# Patient Record
Sex: FEMALE | Race: BLACK OR AFRICAN AMERICAN | NOT HISPANIC OR LATINO | URBAN - METROPOLITAN AREA
[De-identification: names, ages, dates, MRNs, and addresses within clinical notes are randomized per-mention and may not be internally consistent; named-entity substitution may affect disease eponyms.]

---

## 2017-10-28 ENCOUNTER — EMERGENCY (EMERGENCY)
Facility: HOSPITAL | Age: 27
LOS: 1 days | Discharge: HOME | End: 2017-10-28
Attending: EMERGENCY MEDICINE | Admitting: EMERGENCY MEDICINE
Payer: MEDICAID

## 2017-10-28 VITALS
OXYGEN SATURATION: 100 % | HEART RATE: 81 BPM | DIASTOLIC BLOOD PRESSURE: 69 MMHG | TEMPERATURE: 98 F | RESPIRATION RATE: 16 BRPM | SYSTOLIC BLOOD PRESSURE: 114 MMHG

## 2017-10-28 PROCEDURE — 99284 EMERGENCY DEPT VISIT MOD MDM: CPT | Mod: 25

## 2017-10-28 NOTE — ED ADULT TRIAGE NOTE - CHIEF COMPLAINT QUOTE
Patient c/o vaginal bleeding since October 7th, feels weak, has a head ache, and lower abdominal pain for a few days.

## 2017-10-29 LAB
ALBUMIN SERPL ELPH-MCNC: 4.6 G/DL — SIGNIFICANT CHANGE UP (ref 3.3–5)
ALP SERPL-CCNC: 58 U/L — SIGNIFICANT CHANGE UP (ref 40–120)
ALT FLD-CCNC: 28 U/L — SIGNIFICANT CHANGE UP (ref 4–33)
APPEARANCE UR: SIGNIFICANT CHANGE UP
AST SERPL-CCNC: 20 U/L — SIGNIFICANT CHANGE UP (ref 4–32)
BASOPHILS # BLD AUTO: 0.03 K/UL — SIGNIFICANT CHANGE UP (ref 0–0.2)
BASOPHILS NFR BLD AUTO: 0.3 % — SIGNIFICANT CHANGE UP (ref 0–2)
BILIRUB SERPL-MCNC: < 0.2 MG/DL — LOW (ref 0.2–1.2)
BILIRUB UR-MCNC: NEGATIVE — SIGNIFICANT CHANGE UP
BLOOD UR QL VISUAL: HIGH
BUN SERPL-MCNC: 8 MG/DL — SIGNIFICANT CHANGE UP (ref 7–23)
CALCIUM SERPL-MCNC: 9.2 MG/DL — SIGNIFICANT CHANGE UP (ref 8.4–10.5)
CHLORIDE SERPL-SCNC: 102 MMOL/L — SIGNIFICANT CHANGE UP (ref 98–107)
CO2 SERPL-SCNC: 25 MMOL/L — SIGNIFICANT CHANGE UP (ref 22–31)
COLOR SPEC: HIGH
CREAT SERPL-MCNC: 0.83 MG/DL — SIGNIFICANT CHANGE UP (ref 0.5–1.3)
EOSINOPHIL # BLD AUTO: 0.32 K/UL — SIGNIFICANT CHANGE UP (ref 0–0.5)
EOSINOPHIL NFR BLD AUTO: 3.5 % — SIGNIFICANT CHANGE UP (ref 0–6)
GLUCOSE SERPL-MCNC: 89 MG/DL — SIGNIFICANT CHANGE UP (ref 70–99)
GLUCOSE UR-MCNC: NEGATIVE — SIGNIFICANT CHANGE UP
HCG SERPL-ACNC: < 5 MIU/ML — SIGNIFICANT CHANGE UP
HCG UR-SCNC: NEGATIVE — SIGNIFICANT CHANGE UP
HCT VFR BLD CALC: 33.8 % — LOW (ref 34.5–45)
HGB BLD-MCNC: 10.7 G/DL — LOW (ref 11.5–15.5)
IMM GRANULOCYTES # BLD AUTO: 0.04 # — SIGNIFICANT CHANGE UP
IMM GRANULOCYTES NFR BLD AUTO: 0.4 % — SIGNIFICANT CHANGE UP (ref 0–1.5)
KETONES UR-MCNC: SIGNIFICANT CHANGE UP
LEUKOCYTE ESTERASE UR-ACNC: HIGH
LYMPHOCYTES # BLD AUTO: 3.2 K/UL — SIGNIFICANT CHANGE UP (ref 1–3.3)
LYMPHOCYTES # BLD AUTO: 34.6 % — SIGNIFICANT CHANGE UP (ref 13–44)
MCHC RBC-ENTMCNC: 29.9 PG — SIGNIFICANT CHANGE UP (ref 27–34)
MCHC RBC-ENTMCNC: 31.7 % — LOW (ref 32–36)
MCV RBC AUTO: 94.4 FL — SIGNIFICANT CHANGE UP (ref 80–100)
MONOCYTES # BLD AUTO: 0.53 K/UL — SIGNIFICANT CHANGE UP (ref 0–0.9)
MONOCYTES NFR BLD AUTO: 5.7 % — SIGNIFICANT CHANGE UP (ref 2–14)
MUCOUS THREADS # UR AUTO: SIGNIFICANT CHANGE UP
NEUTROPHILS # BLD AUTO: 5.13 K/UL — SIGNIFICANT CHANGE UP (ref 1.8–7.4)
NEUTROPHILS NFR BLD AUTO: 55.5 % — SIGNIFICANT CHANGE UP (ref 43–77)
NITRITE UR-MCNC: NEGATIVE — SIGNIFICANT CHANGE UP
NRBC # FLD: 0 — SIGNIFICANT CHANGE UP
PH UR: 6 — SIGNIFICANT CHANGE UP (ref 4.6–8)
PLATELET # BLD AUTO: 343 K/UL — SIGNIFICANT CHANGE UP (ref 150–400)
PMV BLD: 10 FL — SIGNIFICANT CHANGE UP (ref 7–13)
POTASSIUM SERPL-MCNC: 3.5 MMOL/L — SIGNIFICANT CHANGE UP (ref 3.5–5.3)
POTASSIUM SERPL-SCNC: 3.5 MMOL/L — SIGNIFICANT CHANGE UP (ref 3.5–5.3)
PROT SERPL-MCNC: 8.1 G/DL — SIGNIFICANT CHANGE UP (ref 6–8.3)
PROT UR-MCNC: 300 — HIGH
RBC # BLD: 3.58 M/UL — LOW (ref 3.8–5.2)
RBC # FLD: 12.8 % — SIGNIFICANT CHANGE UP (ref 10.3–14.5)
RBC CASTS # UR COMP ASSIST: >50 — HIGH (ref 0–?)
SODIUM SERPL-SCNC: 140 MMOL/L — SIGNIFICANT CHANGE UP (ref 135–145)
SP GR SPEC: 1.03 — SIGNIFICANT CHANGE UP (ref 1–1.03)
SP GR UR: 1.03 — SIGNIFICANT CHANGE UP (ref 1–1.03)
SQUAMOUS # UR AUTO: SIGNIFICANT CHANGE UP
UROBILINOGEN FLD QL: NORMAL E.U. — SIGNIFICANT CHANGE UP (ref 0.1–0.2)
WBC # BLD: 9.25 K/UL — SIGNIFICANT CHANGE UP (ref 3.8–10.5)
WBC # FLD AUTO: 9.25 K/UL — SIGNIFICANT CHANGE UP (ref 3.8–10.5)
WBC CLUMPS #/AREA URNS HPF: PRESENT — HIGH (ref 0–?)
WBC UR QL: SIGNIFICANT CHANGE UP (ref 0–?)

## 2017-10-29 NOTE — ED PROVIDER NOTE - OBJECTIVE STATEMENT
28 yo woman here w/ vaginal bleeding. States she developed bleeding approx 3 wks ago, daily, passing clots. Saw OB/GYN and was prescribed course of norethindrone. Prior to this, had sono showing PCOS. Also w/ mild lower abd pain. States she came tonight requesting sonogram. Denies fever, lightheadedness, n/v/d.

## 2017-10-29 NOTE — ED ADULT NURSE NOTE - OBJECTIVE STATEMENT
Patient is bought to room 7 with complaints of vaginal bleeding that has been ongoing since October 7th, accompanied with bilateral flank pain. denies fever, chills. Patient is alert and oriented x 4, abdomen soft and nontender, respirations are even and unlabored, urine appears bloody. 20 gauge saline lock placed on left AC, blood drawn and sent. Will follow up and monitor.

## 2017-10-29 NOTE — ED PROVIDER NOTE - PLAN OF CARE
Call your OB/GYN today or tomorrow to schedule follow up appointment for within the next 3-5 days.  Continue taking your medications as prescribed.  Return to this Emergency Department if you experience worsening condition or for any other emergency.

## 2017-10-29 NOTE — ED PROVIDER NOTE - ATTENDING CONTRIBUTION TO CARE
[place] Attending Note - Dr. Hendrix  28 yo woman here w/ vaginal bleeding. States she developed bleeding approx 3 wks ago, daily, passing clots  PE: pt is alert and oriented, perrl, ent normal, membranes are moist, neck supple. no lymphadenopathy or thyroid enlargement, No JVD.  Chest clear to P&A, Heart- reg rhythm without murmur, rubs or gallops, radial pulses equal bilaterally.  Abd is soft, non-tender, Bowel sounds are active. no mass or organomegaly. : No CVA tenderness. Neuro:  Pt alert and oriented x 3. Perrl    Distal neurosensory is intact. Motor function is 5/5 strength bilaterally.  No focal deficits. Extremities:  No edema.  Skin: warm and dry.  Impression: Vaginal bleeding   Plan: labs, Hcg

## 2017-10-29 NOTE — ED PROVIDER NOTE - CARE PLAN
Principal Discharge DX:	Dysfunctional uterine bleeding  Instructions for follow-up, activity and diet:	Call your OB/GYN today or tomorrow to schedule follow up appointment for within the next 3-5 days.  Continue taking your medications as prescribed.  Return to this Emergency Department if you experience worsening condition or for any other emergency.

## 2017-10-30 LAB — SPECIMEN SOURCE: SIGNIFICANT CHANGE UP

## 2017-10-31 RX ORDER — CEPHALEXIN 500 MG
1 CAPSULE ORAL
Qty: 14 | Refills: 0 | OUTPATIENT
Start: 2017-10-31 | End: 2017-11-07

## 2017-10-31 NOTE — ED POST DISCHARGE NOTE - RESULT SUMMARY
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 Patient contacted admits to flank pain but denies fever or chills. Discussed with patient can ERX Keflex 500mg BID x 7 days. Patient agreed and will follow up with MD for repeat UA/UCX. Tried to find [facility] on prescription writer couldn't find [facility] at  gave a verbal rx for Keflex 500mg BID X 7 days. Discussed with patient need to return to ED if symptoms don't continue to improve or recur or develops any new or worsening symptoms that are of concern.

## 2017-11-01 LAB — BACTERIA UR CULT: SIGNIFICANT CHANGE UP

## 2018-01-16 ENCOUNTER — RESULT REVIEW (OUTPATIENT)
Age: 28
End: 2018-01-16

## 2019-02-06 ENCOUNTER — RESULT REVIEW (OUTPATIENT)
Age: 29
End: 2019-02-06

## 2019-07-23 NOTE — ED ADULT NURSE NOTE - TIMING
[Well Groomed] : well groomed [General Appearance - Well Developed] : well developed [Normal Appearance] : normal appearance [General Appearance - Well Nourished] : well nourished [No Deformities] : no deformities [Normal Oral Mucosa] : normal oral mucosa [Normal Conjunctiva] : the conjunctiva exhibited no abnormalities [General Appearance - In No Acute Distress] : no acute distress [Normal Oropharynx] : normal oropharynx [Normal Jugular Venous V Waves Present] : normal jugular venous V waves present [Respiration, Rhythm And Depth] : normal respiratory rhythm and effort [Exaggerated Use Of Accessory Muscles For Inspiration] : no accessory muscle use [Heart Rate And Rhythm] : heart rate and rhythm were normal [Auscultation Breath Sounds / Voice Sounds] : lungs were clear to auscultation bilaterally [Heart Sounds] : normal S1 and S2 [Murmurs] : no murmurs present [Arterial Pulses Normal] : the arterial pulses were normal [Edema] : no peripheral edema present [Abdomen Soft] : soft [Abnormal Walk] : normal gait [Abdomen Tenderness] : non-tender [Cyanosis, Localized] : no localized cyanosis [Nail Clubbing] : no clubbing of the fingernails [Skin Color & Pigmentation] : normal skin color and pigmentation [Skin Turgor] : normal skin turgor [] : no rash [Oriented To Time, Place, And Person] : oriented to person, place, and time [Mood] : the mood was normal [Affect] : the affect was normal [Impaired Insight] : insight and judgment were intact [No Anxiety] : not feeling anxious none

## 2019-09-03 ENCOUNTER — TRANSCRIPTION ENCOUNTER (OUTPATIENT)
Age: 29
End: 2019-09-03

## 2019-09-07 ENCOUNTER — TRANSCRIPTION ENCOUNTER (OUTPATIENT)
Age: 29
End: 2019-09-07

## 2019-09-18 ENCOUNTER — APPOINTMENT (OUTPATIENT)
Dept: OTOLARYNGOLOGY | Facility: CLINIC | Age: 29
End: 2019-09-18

## 2019-10-05 NOTE — ED PROVIDER NOTE - MEDICAL DECISION MAKING DETAILS
Tabby 26 yo woman w/ vaginal bleeding. Will check labs, preg. If not pregnant and not anemic, can f/u as outpt for further evaluation. In setting of non tender exam, no indication for sonogram today.

## 2019-12-22 ENCOUNTER — TRANSCRIPTION ENCOUNTER (OUTPATIENT)
Age: 29
End: 2019-12-22

## 2020-01-04 ENCOUNTER — TRANSCRIPTION ENCOUNTER (OUTPATIENT)
Age: 30
End: 2020-01-04

## 2020-02-06 ENCOUNTER — TRANSCRIPTION ENCOUNTER (OUTPATIENT)
Age: 30
End: 2020-02-06

## 2020-08-13 PROBLEM — Z00.00 ENCOUNTER FOR PREVENTIVE HEALTH EXAMINATION: Status: ACTIVE | Noted: 2020-08-13

## 2020-11-19 ENCOUNTER — RESULT REVIEW (OUTPATIENT)
Age: 30
End: 2020-11-19

## 2021-01-15 ENCOUNTER — EMERGENCY (EMERGENCY)
Facility: HOSPITAL | Age: 31
LOS: 1 days | Discharge: HOME | End: 2021-01-15
Attending: EMERGENCY MEDICINE | Admitting: EMERGENCY MEDICINE
Payer: MEDICAID

## 2021-01-15 VITALS
DIASTOLIC BLOOD PRESSURE: 89 MMHG | SYSTOLIC BLOOD PRESSURE: 143 MMHG | RESPIRATION RATE: 18 BRPM | HEART RATE: 108 BPM | OXYGEN SATURATION: 100 %

## 2021-01-15 VITALS — TEMPERATURE: 98 F

## 2021-01-15 PROCEDURE — 99283 EMERGENCY DEPT VISIT LOW MDM: CPT

## 2021-01-15 RX ORDER — ACETAMINOPHEN 500 MG
650 TABLET ORAL ONCE
Refills: 0 | Status: COMPLETED | OUTPATIENT
Start: 2021-01-15 | End: 2021-01-15

## 2021-01-15 RX ORDER — IBUPROFEN 200 MG
600 TABLET ORAL ONCE
Refills: 0 | Status: COMPLETED | OUTPATIENT
Start: 2021-01-15 | End: 2021-01-15

## 2021-01-15 RX ORDER — OXYCODONE AND ACETAMINOPHEN 5; 325 MG/1; MG/1
1 TABLET ORAL ONCE
Refills: 0 | Status: DISCONTINUED | OUTPATIENT
Start: 2021-01-15 | End: 2021-01-15

## 2021-01-15 RX ADMIN — Medication 650 MILLIGRAM(S): at 16:30

## 2021-01-15 RX ADMIN — OXYCODONE AND ACETAMINOPHEN 1 TABLET(S): 5; 325 TABLET ORAL at 14:19

## 2021-01-15 RX ADMIN — Medication 600 MILLIGRAM(S): at 14:18

## 2021-01-15 NOTE — ED PROVIDER NOTE - CLINICAL SUMMARY MEDICAL DECISION MAKING FREE TEXT BOX
30 year old female with no known PMH presents to the ED complaining of left sided dental pain for 3 days. HD stable. No clinical evidence of deep soft tissue neck infections. Plan for analgesics, dental consult, reassess.

## 2021-01-15 NOTE — PROGRESS NOTE ADULT - SUBJECTIVE AND OBJECTIVE BOX
Patient is a 30y old  Female who presents with a chief complaint of tooth pain.    HPI: 30 year old female with no known PMH presents to the ED complaining of left sided dental pain for 3 days. Pt states she went to her dentist and a dental block which relieved the pain but for the past few hours, pain has worsened. Denies jaw swelling, neck pain, trismus, stridor, fevers and chills. Denies any over complaints.    PAST MEDICAL & SURGICAL HISTORY:  No pertinent past medical history      MEDICATIONS  (STANDING): * No Current Medications as of 31-Oct-2017 12:03 documented in Structured Notes    MEDICATIONS  (PRN): CURRENT ORDERS/:   · 	ibuprofen  Tablet., [Known as MOTRIN.]  	600 milliGRAM(s), Oral, Once, Stop After 1 Doses  	Provider's Contact #: [phone], 15-Alejo-2021, Active, 15-Alejo-2021, Standard  · 	oxycodone    5 mG/acetaminophen 325 mG, [Ordered as PERCOCET]  	1 Tablet(s), Oral, Once, Stop After 1 Doses  	Administration Instructions: ACETAMINOPHEN MAX DAILY DOSE:  ADULT = 4,000 mG/Day      Allergies    No Known Allergies    Intolerances    FAMILY HISTORY:      *Last Dental Visit: 1/14/2021 for tooth pain. Patient reports DDS prescribed Amoxicillin 500 mg 21 tabs q8h for 7 days    Vital Signs Last 24 Hrs  T(C): --  T(F): --  HR: 108 (15 Alejo 2021 13:15) (108 - 108)  BP: 143/89 (15 Alejo 2021 13:15) (143/89 - 143/89)  BP(mean): --  RR: 18 (15 Alejo 2021 13:15) (18 - 18)  SpO2: 100% (15 Alejo 2021 13:15) (100% - 100%)    EOE:  TMJ (-) clicks                    (-) pops                    (-) crepitus             Mandible FROM             Facial bones and MOM grossly intact             (+) trismus limited opening             (-) LAD             (-) swelling             (-) asymmetry             (+) palpation pain in the lymph nodes surrounding left inferior border of mandible             (-) SOB             (+) dysphagia             (-) LOC    IOE:  permanent dentition: grossly intact           hard/soft palate:  (+) palatal torus           tongue/FOM : WNL, tongue piercing            labial/buccal mucosa WNL           (-) percussion           (-) palpation           (-) swelling     Dentition present: #2-13, #15, #18-31  Mobility: none  Caries: gross mesial caries tooth #18    LABS:    DENTAL RADIOGRAPHS: PAN taken and interpreted. Findings include:  - Adult dentition, grossly intact with multiple restorations  - Tooth #18 root-canal treated with gross caries on the mesial aspect; periapical radiolucency noted around apices  - Periapical radiolucency around tooth #29      RADIOLOGY & ADDITIONAL STUDIES:    ASSESSMENT: Necrotic tooth #31 with periapical radiolucency, no associated soft tissue swelling.    PROCEDURE: limited oral evaluation, PAN, 1.0 carpule 2% Lidocaine with 1:100,000 epinephrine via left inferior alveolar nerve block, 1.0 carpule Marcaine with 1:200,000 epinephrine via local buccal infiltration around tooth #18.  Verbal consent given.     RECOMMENDATIONS:  1) OTC pain meds alternating Ibuprofen acetaminophen, prescribed by ED team.  2) Finish abx course prescribed by outpatient DDS  2) Dental F/U with outpatient dentist for comprehensive dental care.   3) If any difficulty swallowing/breathing, fever occur, page dental.     Lucía Beavers DDS, #[id number]  Anayeli Rodarte DDS, #[id number]

## 2021-01-15 NOTE — ED ADULT TRIAGE NOTE - CHIEF COMPLAINT QUOTE
Arrives from home for left sided dental pain, went to dentist yesterday was given pain medications but pain is not relieved. Denies PMH

## 2021-01-15 NOTE — ED PROVIDER NOTE - PATIENT PORTAL LINK FT
You can access the FollowMyHealth Patient Portal offered by [facility] by registering at the following website: [email or web address]. By joining [employer]’s FollowMyHealth portal, you will also be able to view your health information using other applications (apps) compatible with our system.

## 2021-01-15 NOTE — ED PROVIDER NOTE - OBJECTIVE STATEMENT
30 year old female with no known PMH presents to the ED complaining of left sided dental pain for 3 days. Pt states she went to her dentist and a dental block which relieved the pain but for the past few hours, pain has worsened. Denies jaw swelling, neck pain, trismus, stridor, fevers and chills. Denies any over complaints.

## 2021-02-17 ENCOUNTER — RESULT REVIEW (OUTPATIENT)
Age: 31
End: 2021-02-17

## 2021-06-29 ENCOUNTER — TRANSCRIPTION ENCOUNTER (OUTPATIENT)
Age: 31
End: 2021-06-29

## 2022-09-19 ENCOUNTER — NON-APPOINTMENT (OUTPATIENT)
Age: 32
End: 2022-09-19

## 2022-11-06 ENCOUNTER — NON-APPOINTMENT (OUTPATIENT)
Age: 32
End: 2022-11-06

## 2022-11-08 ENCOUNTER — EMERGENCY (EMERGENCY)
Facility: HOSPITAL | Age: 32
LOS: 1 days | Discharge: HOME | End: 2022-11-08
Attending: EMERGENCY MEDICINE | Admitting: STUDENT IN AN ORGANIZED HEALTH CARE EDUCATION/TRAINING PROGRAM
Payer: MEDICAID

## 2022-11-08 VITALS
TEMPERATURE: 100 F | OXYGEN SATURATION: 100 % | HEART RATE: 88 BPM | RESPIRATION RATE: 16 BRPM | DIASTOLIC BLOOD PRESSURE: 118 MMHG | SYSTOLIC BLOOD PRESSURE: 135 MMHG

## 2022-11-08 DIAGNOSIS — H60.90 UNSPECIFIED OTITIS EXTERNA, UNSPECIFIED EAR: ICD-10-CM

## 2022-11-08 LAB
ALBUMIN SERPL ELPH-MCNC: 4.2 G/DL — SIGNIFICANT CHANGE UP (ref 3.3–5)
ALP SERPL-CCNC: 80 U/L — SIGNIFICANT CHANGE UP (ref 40–120)
ALT FLD-CCNC: 26 U/L — SIGNIFICANT CHANGE UP (ref 4–33)
ANION GAP SERPL CALC-SCNC: 11 MMOL/L — SIGNIFICANT CHANGE UP (ref 7–14)
AST SERPL-CCNC: 26 U/L — SIGNIFICANT CHANGE UP (ref 4–32)
BASOPHILS # BLD AUTO: 0.02 K/UL — SIGNIFICANT CHANGE UP (ref 0–0.2)
BASOPHILS NFR BLD AUTO: 0.2 % — SIGNIFICANT CHANGE UP (ref 0–2)
BILIRUB SERPL-MCNC: 0.2 MG/DL — SIGNIFICANT CHANGE UP (ref 0.2–1.2)
BUN SERPL-MCNC: 10 MG/DL — SIGNIFICANT CHANGE UP (ref 7–23)
CALCIUM SERPL-MCNC: 9.6 MG/DL — SIGNIFICANT CHANGE UP (ref 8.4–10.5)
CHLORIDE SERPL-SCNC: 104 MMOL/L — SIGNIFICANT CHANGE UP (ref 98–107)
CO2 SERPL-SCNC: 22 MMOL/L — SIGNIFICANT CHANGE UP (ref 22–31)
CREAT SERPL-MCNC: 0.73 MG/DL — SIGNIFICANT CHANGE UP (ref 0.5–1.3)
EGFR: 111 ML/MIN/1.73M2 — SIGNIFICANT CHANGE UP
EOSINOPHIL # BLD AUTO: 0.08 K/UL — SIGNIFICANT CHANGE UP (ref 0–0.5)
EOSINOPHIL NFR BLD AUTO: 0.8 % — SIGNIFICANT CHANGE UP (ref 0–6)
GLUCOSE SERPL-MCNC: 90 MG/DL — SIGNIFICANT CHANGE UP (ref 70–99)
HCT VFR BLD CALC: 40.1 % — SIGNIFICANT CHANGE UP (ref 34.5–45)
HGB BLD-MCNC: 13.4 G/DL — SIGNIFICANT CHANGE UP (ref 11.5–15.5)
IANC: 7.32 K/UL — SIGNIFICANT CHANGE UP (ref 1.8–7.4)
IMM GRANULOCYTES NFR BLD AUTO: 0.2 % — SIGNIFICANT CHANGE UP (ref 0–0.9)
LYMPHOCYTES # BLD AUTO: 1.41 K/UL — SIGNIFICANT CHANGE UP (ref 1–3.3)
LYMPHOCYTES # BLD AUTO: 15 % — SIGNIFICANT CHANGE UP (ref 13–44)
MCHC RBC-ENTMCNC: 29.3 PG — SIGNIFICANT CHANGE UP (ref 27–34)
MCHC RBC-ENTMCNC: 33.4 GM/DL — SIGNIFICANT CHANGE UP (ref 32–36)
MCV RBC AUTO: 87.7 FL — SIGNIFICANT CHANGE UP (ref 80–100)
MONOCYTES # BLD AUTO: 0.57 K/UL — SIGNIFICANT CHANGE UP (ref 0–0.9)
MONOCYTES NFR BLD AUTO: 6.1 % — SIGNIFICANT CHANGE UP (ref 2–14)
NEUTROPHILS # BLD AUTO: 7.32 K/UL — SIGNIFICANT CHANGE UP (ref 1.8–7.4)
NEUTROPHILS NFR BLD AUTO: 77.7 % — HIGH (ref 43–77)
NRBC # BLD: 0 /100 WBCS — SIGNIFICANT CHANGE UP (ref 0–0)
NRBC # FLD: 0 K/UL — SIGNIFICANT CHANGE UP (ref 0–0)
PLATELET # BLD AUTO: 280 K/UL — SIGNIFICANT CHANGE UP (ref 150–400)
POTASSIUM SERPL-MCNC: 4.1 MMOL/L — SIGNIFICANT CHANGE UP (ref 3.5–5.3)
POTASSIUM SERPL-SCNC: 4.1 MMOL/L — SIGNIFICANT CHANGE UP (ref 3.5–5.3)
PROT SERPL-MCNC: 7.9 G/DL — SIGNIFICANT CHANGE UP (ref 6–8.3)
RBC # BLD: 4.57 M/UL — SIGNIFICANT CHANGE UP (ref 3.8–5.2)
RBC # FLD: 11.9 % — SIGNIFICANT CHANGE UP (ref 10.3–14.5)
SODIUM SERPL-SCNC: 137 MMOL/L — SIGNIFICANT CHANGE UP (ref 135–145)
WBC # BLD: 9.42 K/UL — SIGNIFICANT CHANGE UP (ref 3.8–10.5)
WBC # FLD AUTO: 9.42 K/UL — SIGNIFICANT CHANGE UP (ref 3.8–10.5)

## 2022-11-08 PROCEDURE — 99220: CPT

## 2022-11-08 PROCEDURE — 70481 CT ORBIT/EAR/FOSSA W/DYE: CPT | Mod: 26,MA

## 2022-11-08 PROCEDURE — 99284 EMERGENCY DEPT VISIT MOD MDM: CPT

## 2022-11-08 RX ORDER — IBUPROFEN 200 MG
600 TABLET ORAL ONCE
Refills: 0 | Status: DISCONTINUED | OUTPATIENT
Start: 2022-11-08 | End: 2022-11-08

## 2022-11-08 RX ORDER — CIPROFLOXACIN HCL 0.3 %
4 DROPS OPHTHALMIC (EYE) ONCE
Refills: 0 | Status: COMPLETED | OUTPATIENT
Start: 2022-11-08 | End: 2022-11-08

## 2022-11-08 RX ORDER — KETOROLAC TROMETHAMINE 30 MG/ML
15 SYRINGE (ML) INJECTION EVERY 6 HOURS
Refills: 0 | Status: DISCONTINUED | OUTPATIENT
Start: 2022-11-08 | End: 2022-11-10

## 2022-11-08 RX ORDER — OXYCODONE AND ACETAMINOPHEN 5; 325 MG/1; MG/1
1 TABLET ORAL ONCE
Refills: 0 | Status: DISCONTINUED | OUTPATIENT
Start: 2022-11-08 | End: 2022-11-08

## 2022-11-08 RX ORDER — MORPHINE SULFATE 50 MG/1
4 CAPSULE, EXTENDED RELEASE ORAL ONCE
Refills: 0 | Status: DISCONTINUED | OUTPATIENT
Start: 2022-11-08 | End: 2022-11-08

## 2022-11-08 RX ORDER — MORPHINE SULFATE 50 MG/1
2 CAPSULE, EXTENDED RELEASE ORAL ONCE
Refills: 0 | Status: DISCONTINUED | OUTPATIENT
Start: 2022-11-08 | End: 2022-11-08

## 2022-11-08 RX ORDER — KETOROLAC TROMETHAMINE 30 MG/ML
30 SYRINGE (ML) INJECTION ONCE
Refills: 0 | Status: DISCONTINUED | OUTPATIENT
Start: 2022-11-08 | End: 2022-11-08

## 2022-11-08 RX ORDER — KETOROLAC TROMETHAMINE 30 MG/ML
15 SYRINGE (ML) INJECTION ONCE
Refills: 0 | Status: DISCONTINUED | OUTPATIENT
Start: 2022-11-08 | End: 2022-11-08

## 2022-11-08 RX ORDER — CIPROFLOXACIN HCL 0.3 %
2 DROPS OPHTHALMIC (EYE)
Refills: 0 | Status: DISCONTINUED | OUTPATIENT
Start: 2022-11-08 | End: 2022-11-12

## 2022-11-08 RX ORDER — OXYCODONE AND ACETAMINOPHEN 5; 325 MG/1; MG/1
2 TABLET ORAL EVERY 6 HOURS
Refills: 0 | Status: DISCONTINUED | OUTPATIENT
Start: 2022-11-08 | End: 2022-11-09

## 2022-11-08 RX ADMIN — Medication 4 DROP(S): at 14:53

## 2022-11-08 RX ADMIN — MORPHINE SULFATE 4 MILLIGRAM(S): 50 CAPSULE, EXTENDED RELEASE ORAL at 23:52

## 2022-11-08 RX ADMIN — OXYCODONE AND ACETAMINOPHEN 1 TABLET(S): 5; 325 TABLET ORAL at 14:53

## 2022-11-08 RX ADMIN — Medication 1 TABLET(S): at 22:21

## 2022-11-08 RX ADMIN — Medication 15 MILLIGRAM(S): at 19:47

## 2022-11-08 RX ADMIN — MORPHINE SULFATE 2 MILLIGRAM(S): 50 CAPSULE, EXTENDED RELEASE ORAL at 22:11

## 2022-11-08 RX ADMIN — Medication 30 MILLIGRAM(S): at 14:14

## 2022-11-08 NOTE — CONSULT NOTE ADULT - PROBLEM SELECTOR RECOMMENDATION 9
1. Pain control as per ED  2. Wick placed left EAC, start ciprofloxacin otic  4 drops bib x 7 days  3. CT IAC  4. If CT is negative then can dc on oral abx. and follow up with ENT clinic to remove wick next Monday. [phone], patient will also be added to referral list so office can contact.   5. Can give a dose of IV Unasyn x 1   6. Will discuss with ENT attending Dr. Pearson 1. Pain control as per ED  2. Wick placed left EAC, start ciprofloxacin otic  2 drops qid until wick is removed then 4 drops bid.   3. CT IAC  4. If CT is negative then can dc on oral abx. and follow up with ENT clinic to remove wick next Monday. [phone], patient will also be added to referral list so office can contact.   5. Can give a dose of IV Unasyn x 1   6. discussed with ENT attending Dr. Pearson

## 2022-11-08 NOTE — ED CDU PROVIDER INITIAL DAY NOTE - PHYSICAL EXAMINATION
CONSTITUTIONAL: Well-appearing; well-nourished; in  mild distress 2/2 pain, tearful. Non-toxic appearing.   NEURO: Alert & oriented. Gait steady without assistance. Sensory and motor functions are grossly intact.  PSYCH: Mood appropriate. Thought processes intact.   ENT: ********  NECK: Supple.   CARD: Regular rate and rhythm, no murmurs  RESP: No accessory muscle use; breath sounds clear and equal bilaterally; no wheezes, rhonchi, or rales     ABD: Soft; non-distended; non-tender.   SKIN: Warm; dry; no apparent lesions or exudate CONSTITUTIONAL: Well-appearing; well-nourished; in  mild distress 2/2 pain, tearful. Non-toxic appearing.   NEURO: Alert & oriented. Gait steady without assistance. Sensory and motor functions are grossly intact.  PSYCH: Mood appropriate. Thought processes intact.   ENT: Right EAC are without edema, erythema, or tenderness on speculum exam. Right TMs pearly, no erythema or bulging. Left ear tender with helix movement, ear wick in place. No mastoid tenderness b/l.  NECK: Supple. (+) left sided anterior lymphadenopathy.   CARD: Regular rate and rhythm, no murmurs  RESP: No accessory muscle use; breath sounds clear and equal bilaterally; no wheezes, rhonchi, or rales     ABD: Soft; non-distended; non-tender.   SKIN: Warm; dry; no apparent lesions or exudate

## 2022-11-08 NOTE — ED CDU PROVIDER INITIAL DAY NOTE - OBJECTIVE STATEMENT
33 y/o female no pmh  w/ L ear pain x2 days. Pt admits to itching to L ear over the last few weeks. Pt has been scratching it a lot and developed pain over the last 2 days. pt went tot the urgent care yesterday and was dx w/ OM and given Augmentin, fluticasone and Motrin. Pt woke up today w/ worsening pain prompting ER visit. Pt now c/o l facial pain and has pain with swallowing. Pt states that hearing is muffled out of L ear. Denies chest pain, sob, abd pain, n/v,d, numbness, tingling, weakness, dizziness, syncope or chills.    CDU JEROME Hernandez: Agree with above, to add patient has been scratching her ear with a rat-tooth comb that has a thin point end and washed her hair about 1 week ago, so she believes water may have gotten into her ear. Associated with L-sided HA, subjective fever, and chills. No other voiced complaints.

## 2022-11-08 NOTE — CONSULT NOTE ADULT - NS ATTEND AMEND GEN_ALL_CORE FT
1. Pain control as per ED  2. Wick placed left EAC, start ciprofloxacin otic  2 drops qid until wick is removed then 4 drops bid.   3. CT IAC  4. If CT is negative then can dc on oral abx. and follow up with ENT clinic to remove wick next Monday. [phone], patient will also be added to referral list so office can contact.   5. Can give a dose of IV Unasyn x 1     Agree with above assessment and plan  Thank you for your referral  Michel Martinez MD

## 2022-11-08 NOTE — ED ADULT TRIAGE NOTE - CHIEF COMPLAINT QUOTE
Presents to ED c/o ear pain, 10/10, crying in triage, drainage/pus noted. Pt went to ED yesterday and was prescribed fluticasone, amoxicillin, and IBU. No PMHx.

## 2022-11-08 NOTE — CONSULT NOTE ADULT - SUBJECTIVE AND OBJECTIVE BOX
CC: left ear pain     HPI: 32 year old female with no PMH that presents to the ED with 2 days of ear pain. Patient was evaluated by [facility] and started on Augmentin 875 bid for OM. Still C/O of pain that is now increased to 10/10 and radiates to left temporal bone. Now having drainage from left EAC. Also C/O low grade fevers. Denies any chills, N/V, rigors, cough, sore throat or any other complaints.        PAST MEDICAL & SURGICAL HISTORY:  No pertinent past medical history        Allergies    No Known Allergies    Intolerances      MEDICATIONS  (STANDING):  ciprofloxacin  0.3% Otic Solution 4 Drop(s) Left Ear Once  dexAMETHasone 0.1% Ophthalmic Solution for OTIC Use 4 Drop(s) Left Ear Once    MEDICATIONS  (PRN):    ROS:   ENT: all negative except as noted in HPI   CV: denies palpitations  Pulm: denies SOB, cough, hemoptysis  GI: denies change in apetite, indigestion, n/v  : denies pertinent urinary symptoms, urgency  Neuro: denies numbness/tingling, loss of sensation  Psych: denies anxiety  MS: denies muscle weakness, instability  Heme: denies easy bruising or bleeding  Endo: denies heat/cold intolerance, excessive sweating  Vascular: denies LE edema    Vital Signs Last 24 Hrs  T(C): 37.8 (08 Nov 2022 12:59), Max: 37.8 (08 Nov 2022 12:59)  T(F): 100 (08 Nov 2022 12:59), Max: 100 (08 Nov 2022 12:59)  HR: 88 (08 Nov 2022 12:59) (88 - 88)  BP: 135/118 (08 Nov 2022 12:59) (135/118 - 135/118)  BP(mean): --  RR: 16 (08 Nov 2022 12:59) (16 - 16)  SpO2: 100% (08 Nov 2022 12:59) (100% - 100%)    Parameters below as of 08 Nov 2022 12:59  Patient On (Oxygen Delivery Method): room air                  PHYSICAL EXAM:  Gen: NAD  Skin: No rashes, bruises, or lesions  Head: Normocephalic, Atraumatic  Face: no edema, erythema, or fluctuance. Parotid glands soft without mass  Eyes: no scleral injection  Ears: Right - ear canal clear, TM intact without effusion or erythema. No evidence of any fluid drainage. No mastoid tenderness, erythema, or ear bulging            Left - edematous unable to visualize TM. + clear fluid drainage. No mastoid tenderness, erythema, or ear bulging  Nose: Nares bilaterally patent, no discharge  Mouth: No Stridor / Drooling / Trismus.  Mucosa moist, tongue/uvula midline, oropharynx clear  Neck: Flat, supple, no lymphadenopathy, trachea midline, no masses  Lymphatic: No lymphadenopathy  Resp: breathing easily, no stridor  CV: no peripheral edema/cyanosis  GI: nondistended   Peripheral vascular: no JVD or edema  Neuro: facial nerve intact, no facial droop.

## 2022-11-08 NOTE — ED CDU PROVIDER INITIAL DAY NOTE - MEDICAL DECISION MAKING DETAILS
31 y/o female presents to the ED with otitis externa and media on 1 day of Augmentin found to have abnormal CT concerning for bony involvement of the tegmen. Accepted to CDU for MRI Brain w/w/o, ABx, pain control, and general monitoring/observation. Pt agreeable with plan, dispo pending.

## 2022-11-08 NOTE — ED CDU PROVIDER INITIAL DAY NOTE - NSICDXFAMILYHX_GEN_ALL_CORE_FT
FAMILY HISTORY:  Mother  Still living? Unknown  FH: diabetes mellitus, Age at diagnosis: Age Unknown  FH: HTN (hypertension), Age at diagnosis: Age Unknown

## 2022-11-08 NOTE — ED PROVIDER NOTE - CLINICAL SUMMARY MEDICAL DECISION MAKING FREE TEXT BOX
33 y/o female w/ L ear pain x2 days, worse x today- External canal very edematous, unable to visualize TM- no mastoid tenderness but pt is febrile- will obtain ENT consult, CT IAC, ciprodex drops, pain control, reassess

## 2022-11-08 NOTE — ED CDU PROVIDER INITIAL DAY NOTE - NS ED ATTENDING STATEMENT MOD
This was a shared visit with the LOVE. I reviewed and verified the documentation and independently performed the documented:

## 2022-11-08 NOTE — CONSULT NOTE ADULT - ASSESSMENT
32 year old female with left ear pain and drainage  32 year old female with left ear pain and drainage.

## 2022-11-08 NOTE — ED PROVIDER NOTE - OBJECTIVE STATEMENT
31 y/o female no pmh  w/ L ear pain x2 days. Pt admits to itching to L ear over the last few weeks. Pt has been scratching it a lot and developed pain over the alst 2 days. pt went tot the urgent care yesterday and was dx w/ OM and given augmentin, fluticasone and momtrin. Pt woke up today w/ worsening pain proimpting ER visit. Pt now c/o l facial pain and has pain with swallowing. Pt states that hearing is muffled out of L ear. Denies chest pain, sob, abd pain, n/v,d, numbness, tingling, weakness, dizziness, syncope or chills.

## 2022-11-08 NOTE — ED ADULT TRIAGE NOTE - TEMPERATURE IN FAHRENHEIT (DEGREES F)
Call to Fiordaliza to discuss her treatment on Friday. Fiordaliza's sister reports that Fiordaliza 100

## 2022-11-08 NOTE — ED ADULT NURSE REASSESSMENT NOTE - NS ED NURSE REASSESS COMMENT FT1
Patient complaining of left ear pain persisting. JEROME Aquino made aware. Patient medicated as ordered. Safety maintained.

## 2022-11-09 PROCEDURE — 99226: CPT

## 2022-11-09 PROCEDURE — 99283 EMERGENCY DEPT VISIT LOW MDM: CPT

## 2022-11-09 PROCEDURE — 70553 MRI BRAIN STEM W/O & W/DYE: CPT | Mod: 26,MA

## 2022-11-09 RX ORDER — DIPHENHYDRAMINE HCL 50 MG
25 CAPSULE ORAL ONCE
Refills: 0 | Status: COMPLETED | OUTPATIENT
Start: 2022-11-09 | End: 2022-11-09

## 2022-11-09 RX ORDER — ACETAMINOPHEN 500 MG
1000 TABLET ORAL ONCE
Refills: 0 | Status: COMPLETED | OUTPATIENT
Start: 2022-11-09 | End: 2022-11-09

## 2022-11-09 RX ADMIN — Medication 15 MILLIGRAM(S): at 03:08

## 2022-11-09 RX ADMIN — OXYCODONE AND ACETAMINOPHEN 2 TABLET(S): 5; 325 TABLET ORAL at 03:45

## 2022-11-09 RX ADMIN — Medication 15 MILLIGRAM(S): at 02:53

## 2022-11-09 RX ADMIN — Medication 1 TABLET(S): at 17:04

## 2022-11-09 RX ADMIN — Medication 2 DROP(S): at 17:06

## 2022-11-09 RX ADMIN — Medication 15 MILLIGRAM(S): at 22:03

## 2022-11-09 RX ADMIN — Medication 2 DROP(S): at 01:20

## 2022-11-09 RX ADMIN — Medication 1 TABLET(S): at 05:57

## 2022-11-09 RX ADMIN — OXYCODONE AND ACETAMINOPHEN 2 TABLET(S): 5; 325 TABLET ORAL at 09:00

## 2022-11-09 RX ADMIN — Medication 400 MILLIGRAM(S): at 18:04

## 2022-11-09 RX ADMIN — Medication 15 MILLIGRAM(S): at 14:31

## 2022-11-09 RX ADMIN — Medication 25 MILLIGRAM(S): at 05:57

## 2022-11-09 RX ADMIN — Medication 2 DROP(S): at 22:05

## 2022-11-09 RX ADMIN — Medication 2 DROP(S): at 05:58

## 2022-11-09 RX ADMIN — OXYCODONE AND ACETAMINOPHEN 2 TABLET(S): 5; 325 TABLET ORAL at 02:45

## 2022-11-09 RX ADMIN — Medication 2 DROP(S): at 11:29

## 2022-11-09 RX ADMIN — Medication 25 MILLIGRAM(S): at 10:58

## 2022-11-09 RX ADMIN — Medication 1000 MILLIGRAM(S): at 18:39

## 2022-11-09 RX ADMIN — Medication 15 MILLIGRAM(S): at 22:18

## 2022-11-09 NOTE — ED CDU PROVIDER SUBSEQUENT DAY NOTE - ATTENDING APP SHARED VISIT CONTRIBUTION OF CARE
I (Chari) agree with above, I performed a history and physical. Counseled kevin medical staff, physician assistant, and/or medical student on medical decision making as documented. Medical decisions and treatment interventions were made in real time during the patient encounter. Additionally and/or with the following exceptions:

## 2022-11-09 NOTE — PROGRESS NOTE ADULT - SUBJECTIVE AND OBJECTIVE BOX
CT and MRI reviewed by ENT attending. Patient can be discharged with oral abx and drops. Follow up in ENT clinic for removal of ear wick, ENT office number . Patients contact info was sent to office so patient can be contacted for appt. If office doesn't call patient should call to schedule appt or have wick removed by PCP.

## 2022-11-10 VITALS
SYSTOLIC BLOOD PRESSURE: 143 MMHG | HEART RATE: 75 BPM | TEMPERATURE: 98 F | RESPIRATION RATE: 18 BRPM | DIASTOLIC BLOOD PRESSURE: 75 MMHG | OXYGEN SATURATION: 98 %

## 2022-11-10 PROCEDURE — 99217: CPT

## 2022-11-10 RX ORDER — ACETAMINOPHEN 500 MG
2 TABLET ORAL
Qty: 42 | Refills: 0
Start: 2022-11-10 | End: 2022-11-16

## 2022-11-10 RX ORDER — CIPROFLOXACIN 6 MG/ML
2 SUSPENSION INTRATYMPANIC
Qty: 56 | Refills: 0
Start: 2022-11-10 | End: 2022-11-16

## 2022-11-10 RX ORDER — ACETAMINOPHEN 500 MG
1000 TABLET ORAL ONCE
Refills: 0 | Status: COMPLETED | OUTPATIENT
Start: 2022-11-10 | End: 2022-11-10

## 2022-11-10 RX ORDER — IBUPROFEN 200 MG
1 TABLET ORAL
Qty: 21 | Refills: 0
Start: 2022-11-10 | End: 2022-11-16

## 2022-11-10 RX ADMIN — Medication 1000 MILLIGRAM(S): at 02:38

## 2022-11-10 RX ADMIN — Medication 2 DROP(S): at 05:43

## 2022-11-10 RX ADMIN — Medication 15 MILLIGRAM(S): at 05:42

## 2022-11-10 RX ADMIN — Medication 400 MILLIGRAM(S): at 02:23

## 2022-11-10 RX ADMIN — Medication 1 TABLET(S): at 05:43

## 2022-11-10 RX ADMIN — Medication 15 MILLIGRAM(S): at 05:57

## 2022-11-10 NOTE — ED CDU PROVIDER SUBSEQUENT DAY NOTE - NS ED ATTENDING STATEMENT MOD
This was a shared visit with the LOVE. I reviewed and verified the documentation and independently performed the documented:
This was a shared visit with the LOVE. I reviewed and verified the documentation and independently performed the documented:

## 2022-11-10 NOTE — ED CDU PROVIDER SUBSEQUENT DAY NOTE - ATTENDING APP SHARED VISIT CONTRIBUTION OF CARE
I have personally performed a face to face medical and diagnostic evaluation of the patient. I have discussed with and reviewed the LOVE's note and agree with the History, ROS, Physical Exam and MDM unless otherwise indicated. A brief summary of my personal evaluation and impression can be found below.    31 y/o female presents to the ED with otitis externa and media on 1 day of Augmentin found to have abnormal CT concerning for bony involvement of the tegmen. Accepted to CDU for MRI Brain w/w/o, ABx, pain control, and general monitoring/observation. Pt agreeable with plan, dispo pending.  MRI shows otitis externa and otitis media without bony involvement. Continue pain control and abx.    No acute events overnight pt reports still w ear discomfort this morning however pain is improved and would like to go home. Seen and cleared by ENT for dc w rec for abx gtt and po abx. No fever. Tolerated PO this a.m w/o issue. Handling secretions.     All other ROS negative, except as above and as per HPI and ROS section.    VITALS: Initial triage and subsequent vitals have been reviewed by me.  GEN APPEARANCE: WDWN, alert, non-toxic, NAD  HEAD: Atraumatic.  EARS:  L ear w wick, occluded w dry discharge externally    MOUTH: MMM, no tonsillar erythema, swelling or exudates, protecting airway, handling secretions.  EYES: PERRLa, EOMI, vision grossly intact.   NECK: Supple  CV: RRR, S1S2, no c/r/m/g. Cap refill <2 seconds. No bruits.   LUNGS: CTAB. No abnormal breath sounds.  ABDOMEN: Soft, NTND. No guarding or rebound.   MSK/EXT: No spinal or extremity point tenderness. No CVA ttp. Pelvis stable. No peripheral edema.  NEURO: Alert, follows commands. Weight bearing normal. Speech normal. Sensation and motor normal x4 extremities.   SKIN: Warm, dry and intact. No rash.  PSYCH: Appropriate    Plan/MDM:  31 y/o female presents to the ED with otitis externa and media on 1 day of Augmentin found to have abnormal CT concerning for bony involvement of the tegmen. Accepted to CDU for MRI Brain w/w/o, ABx, pain control, and general monitoring/observation. Pt agreeable with plan, dispo pending.  MRI shows otitis externa and otitis media without bony involvement. Continue pain control and abx.    No acute events overnight pt reports still w ear discomfort this morning however pain is improved and would like to go home. Seen and cleared by ENT for dc w rec for abx gtt and po abx. No fever. Tolerated PO this a.m w/o issue. Handling secretions. exam vss non toxic well appearing pain controlled PE as above ddx c/f likely OE/OM as confirmed of MR pt agreeable w plan for dc will dc w abx, ent f/u for wick removal.

## 2022-11-10 NOTE — ED CDU PROVIDER SUBSEQUENT DAY NOTE - HISTORY
31 y/o female presents to the ED with otitis externa and media on 1 day of Augmentin found to have abnormal CT concerning for bony involvement of the tegmen. Accepted to CDU for MRI Brain w/w/o, ABx, pain control, and general monitoring/observation. Pt agreeable with plan, dispo pending.  MRI shows otitis externa and otitis media without bony involvement. Continue pain control and abx.
Pt with episode of pain - Morphine and Toradol given, pain now relieved, patient lying comfortably in bed.

## 2022-11-10 NOTE — ED CDU PROVIDER DISPOSITION NOTE - NSFOLLOWUPINSTRUCTIONS_ED_ALL_ED_FT
Use ciprofloxacin otic 2 drops 4 times a day until wick is removed then 4 drops twice a day for a week   Take Augmentin 1 tablet twice a day for a week.  Take Motrin 800mg every 8hrs with food for pain   Take Tylenol 1000mg (2 500mg tabs) every 6 to 8 hours as needed for pain, never exceeding 4000mg a day     Follow with your Primary Doctor for wick removal tomorrow or follow up with ENT clinic to remove wick on Monday. ENT clinic call .     Otitis Media, Adult    Ear anatomy showing the middle ear.   Otitis media is a condition in which the middle ear is red and swollen (inflamed) and full of fluid. The middle ear is the part of the ear that contains bones for hearing as well as air that helps send sounds to the brain. The condition usually goes away on its own.      What are the causes?    This condition is caused by a blockage in the eustachian tube. This tube connects the middle ear to the back of the nose. It normally allows air into the middle ear. The blockage is caused by fluid or swelling. Problems that can cause blockage include:  •A cold or infection that affects the nose, mouth, or throat.      •Allergies.      •An irritant, such as tobacco smoke.      •Adenoids that have become large. The adenoids are soft tissue located in the back of the throat, behind the nose and the roof of the mouth.      •Growth or swelling in the upper part of the throat, just behind the nose (nasopharynx).      •Damage to the ear caused by a change in pressure. This is called barotrauma.        What increases the risk?    You are more likely to develop this condition if you:  •Smoke or are exposed to tobacco smoke.      •Have an opening in the roof of your mouth (cleft palate).      •Have acid reflux.      •Have problems in your body's defense system (immune system).        What are the signs or symptoms?    Symptoms of this condition include:  •Ear pain.      •Fever.      •Problems with hearing.      •Being tired.      •Fluid leaking from the ear.      •Ringing in the ear.        How is this treated?    This condition can go away on its own within 3–5 days. But if the condition is caused by germs (bacteria) and does not go away on its own, or if it keeps coming back, your doctor may:  •Give you antibiotic medicines.      •Give you medicines for pain.        Follow these instructions at home:    •Take over-the-counter and prescription medicines only as told by your doctor.      •If you were prescribed an antibiotic medicine, take it as told by your doctor. Do not stop taking it even if you start to feel better.      •Keep all follow-up visits.        Contact a doctor if:    •You have bleeding from your nose.      •There is a lump on your neck.      •You are not feeling better in 5 days.      •You feel worse instead of better.        Get help right away if:    •You have pain that is not helped with medicine.      •You have swelling, redness, or pain around your ear.      •You get a stiff neck.      •You cannot move part of your face (paralysis).      •You notice that the bone behind your ear hurts when you touch it.      •You get a very bad headache.        Summary    •Otitis media means that the middle ear is red, swollen, and full of fluid.      •This condition usually goes away on its own.       •If the problem does not go away, treatment may be needed. You may be given medicines to treat the infection or to treat your pain.      •If you were prescribed an antibiotic medicine, take it as told by your doctor. Do not stop taking it even if you start to feel better.      •Keep all follow-up visits.      This information is not intended to replace advice given to you by your health care provider. Make sure you discuss any questions you have with your health care provider.

## 2022-11-10 NOTE — ED CDU PROVIDER SUBSEQUENT DAY NOTE - MEDICAL DECISION MAKING DETAILS
31 y/o female presents to the ED with otitis externa and media on 1 day of Augmentin found to have abnormal CT concerning for bony involvement of the tegmen.  MRI shows otitis externa and otitis media without bony involvement. Continue pain control and abx.
31 y/o female presents to the ED with otitis externa and media on 1 day of Augmentin found to have abnormal CT concerning for bony involvement of the tegmen. Accepted to CDU for MRI Brain w/w/o, ABx, pain control, and general monitoring/observation. Pt agreeable with plan, dispo pending.

## 2022-11-10 NOTE — ED CDU PROVIDER DISPOSITION NOTE - PATIENT PORTAL LINK FT
You can access the FollowMyHealth Patient Portal offered by [facility] by registering at the following website: [email or web address]. By joining [employer]’s FollowMyHealth portal, you will also be able to view your health information using other applications (apps) compatible with our system.

## 2022-11-10 NOTE — ED CDU PROVIDER DISPOSITION NOTE - ATTENDING CONTRIBUTION TO CARE
I have personally performed a face to face medical and diagnostic evaluation of the patient. I have discussed with and reviewed the LOVE's note and agree with the History, ROS, Physical Exam and MDM unless otherwise indicated. A brief summary of my personal evaluation and impression can be found below.    Please see CDU Subsequent day note for further details.

## 2022-11-10 NOTE — ED CDU PROVIDER DISPOSITION NOTE - NS ED ATTENDING STATEMENT MOD
This was a shared visit with the LOVE. I reviewed and verified the documentation and independently performed the documented: Attending with

## 2022-11-10 NOTE — ED CDU PROVIDER SUBSEQUENT DAY NOTE - PHYSICAL EXAMINATION
CONSTITUTIONAL: Well-appearing; well-nourished; in  mild distress 2/2 pain, tearful. Non-toxic appearing.   NEURO: Alert & oriented. Gait steady without assistance. Sensory and motor functions are grossly intact.  PSYCH: Mood appropriate. Thought processes intact.   ENT: Right EAC are without edema, erythema, or tenderness on speculum exam. Right TMs pearly, no erythema or bulging. Left ear tender with helix movement, ear wick in place. No mastoid tenderness b/l.  NECK: Supple. (+) left sided anterior lymphadenopathy.   CARD: Regular rate and rhythm, no murmurs  RESP: No accessory muscle use; breath sounds clear and equal bilaterally; no wheezes, rhonchi, or rales     ABD: Soft; non-distended; non-tender.   SKIN: Warm; dry; no apparent lesions or exudate
CONSTITUTIONAL: Well-appearing; well-nourished; in  mild distress 2/2 pain, tearful. Non-toxic appearing.   NEURO: Alert & oriented. Gait steady without assistance. Sensory and motor functions are grossly intact.  PSYCH: Mood appropriate. Thought processes intact.   ENT: Right EAC are without edema, erythema, or tenderness on speculum exam. Right TMs pearly, no erythema or bulging. Left ear tender with helix movement, ear wick in place. No mastoid tenderness b/l.  NECK: Supple. (+) left sided anterior lymphadenopathy.   CARD: Regular rate and rhythm, no murmurs  RESP: No accessory muscle use; breath sounds clear and equal bilaterally; no wheezes, rhonchi, or rales     ABD: Soft; non-distended; non-tender.   SKIN: Warm; dry; no apparent lesions or exudate

## 2022-11-10 NOTE — ED CDU PROVIDER SUBSEQUENT DAY NOTE - NSICDXFAMILYHX_GEN_ALL_CORE_FT
FAMILY HISTORY:  Mother  Still living? Unknown  FH: diabetes mellitus, Age at diagnosis: Age Unknown  FH: HTN (hypertension), Age at diagnosis: Age Unknown    
FAMILY HISTORY:  Mother  Still living? Unknown  FH: diabetes mellitus, Age at diagnosis: Age Unknown  FH: HTN (hypertension), Age at diagnosis: Age Unknown

## 2022-11-10 NOTE — ED CDU PROVIDER SUBSEQUENT DAY NOTE - NS ED ROS FT
GENERAL: As per HPI   HEENT: As per HPI   CARDIAC: no chest pain  PULMONARY: no cough, no shortness of breath  GI: no abdominal pain, no nausea, no vomiting, no diarrhea, no constipation  SKIN: no rashes  NEURO: no headache, no weakness, no dizziness  MSK: No joint pain  All other systems reviewed as negative.
GENERAL: As per HPI   HEENT: As per HPI   CARDIAC: no chest pain  PULMONARY: no cough, no shortness of breath  GI: no abdominal pain, no nausea, no vomiting, no diarrhea, no constipation  SKIN: no rashes  NEURO: no headache, no weakness, no dizziness  MSK: No joint pain  All other systems reviewed as negative.

## 2022-12-28 NOTE — ED CDU PROVIDER DISPOSITION NOTE - CLINICAL COURSE
Care Transition Weekly Telephone Call Attempt    Discharge Date: 12/19/22  Discharge Location: [facility]: [address] [place] [address]    Call Attempt Date: 12/28/2022  Call Attempt: 1 31 y/o female presents to the ED with otitis externa and media on 1 day of Augmentin found to have abnormal CT concerning for bony involvement of the tegmen. Accepted to CDU for MRI Brain w/w/o, ABx, pain control, and general monitoring/observation. Pt agreeable with plan, dispo pending.  MRI shows otitis externa and otitis media without bony involvement. After reassessment this morning, patient pain better controlled and patient would like to be discharged home. Patient can follow up with PCP tomorrow for wick removal or with clinic on monday for wick removal. patient will be discharged on cipro gtt and augmentin and motrin and tylenol for pain, patient agreeable to plan. patient clincally and vitally stable for dc.

## 2023-07-25 ENCOUNTER — NON-APPOINTMENT (OUTPATIENT)
Age: 33
End: 2023-07-25

## 2023-10-01 ENCOUNTER — NON-APPOINTMENT (OUTPATIENT)
Age: 33
End: 2023-10-01

## 2023-11-17 NOTE — ED PROVIDER NOTE - DISPOSITION TYPE
Chief Complaint   Patient presents with   • Follow-up     Dupexant- 1 week - 2 weeks    • Dysphagia   • Heartburn   • GERD       HPI: Ashok Soriano is a 37 year old male here with service dog for a follow up office visit for eosinophilic esophagitis and loose stools.    Pt has been taking Dupixent and has seen an improvement in his dysphagia, which has significantly improved his symptoms however patient developed insomnia and on a Dupixent group site social media other people had noted decreasing to every 2 weeks helped with the insomnia so patient was later prescribed naproxen every few weeks.  Patient notes a few days after receiving the apixaban that he does develop a rash in the web of his fingers that appears to be similar to eczema. Patient continued to have issues with reflux.  Patient states refluxes has been daily unrelated to food or eating happening randomly throughout the day.  Patient denies any abdominal pain, diarrhea, melena, nausea vomiting, appetite change or weight loss.  Previously stools negative for gastrointestinal pathogens and C. difficile, normal fecal calprotectin.  Labs with normal TSH and celiac serologies.  Patient has not tried loperamide or Metamucil.     Denies any difficulties breathing, throat irritation, fever, or polyarthralgia.  Rash has improved.   no injection site reactions.   Denies nausea or vomiting.     3-5 cups coffee daily.  Alcohol a few times per week, 2-3 times per week patient drinks about 6 white claws.  Denies NSAID, tobacco, or illicit drug use.  Patient denies any history of blood transfusions, herbal supplement use or incarceration however patient has several tattoos.     Patient report no family history of problems with the esophagus, stomach, gallbladder, liver, pancreas, colon, ulcerative colitis, Crohn's or celiac.    Patient has no history of appendectomy, cholecystectomy or colon resection    Previous Medications:  Fluticasone has been helpful in the  past for yearly but most recent treatment with fluticasone was ineffective.  [facility] denied Ac[facility]Hex.  oral budesonide solution caused anxiety.  Decreased renal function on proton pump inhibitor.     5/30/2023 CT abdomen and pelvis with IV contrast only:  Mild mesenteric adenitis, normal appendix.  5/12/2023 [facility]GD [facility]:  Biopsies consistent with eosinophilic esophagitis with distal esophageal biopsies with intraepithelial eosinophils from 0-40 per high-powered field.  Proximal esophageal biopsies with intraepithelial eosinophils from 0-15 per high-power field.  8/25/2022 EGD with dilatation:  Eosinophilic esophagitis and esophageal stricture.  Distal esophageal biopsies with intraepithelial eosinophils up to 65 per high-power field, proximal esophageal biopsies with intraepithelial eosinophils up to 25 per high powered field.  Biopsies negative for Beltran's.  12/17/2020 EGD [facility]: revealed esophageal stenosis and histologic evidence of eosinophilic esophagitis.      Medications:  Current Outpatient Medications   Medication Sig   • escitalopram (Lexapro) 10 MG tablet Take 10 mg by mouth daily.   • famotidine (PEPCID) 40 MG tablet Take 1 tablet by mouth in the morning and 1 tablet in the evening.   • Dupilumab (Dupixent) 300 MG/2ML Solution Pen-injector Inject 2 mLs into the skin 1 day a week.   • ondansetron (Zofran) 4 MG tablet Take 1 tablet by mouth every 8 hours as needed for Nausea.   • fexofenadine (ALLEGRA) 180 MG tablet Take 180 mg by mouth daily as needed.   • LORazepam (ATIVAN) 0.5 MG tablet Take 1 tablet by mouth daily as needed.   • Cholecalciferol (Vitamin D3) 125 mcg (5,000 units) tablet Take 125 mcg by mouth daily.   • propRANolol (INDERAL) 20 MG tablet Take 80 mg by mouth daily. Indications: Feeling Anxious   • Acetaminophen 325 MG Cap TAKE ONE TABLET BY MOUTH Q4H PRN     No current facility-administered medications for this visit.       Allergies:  Allergies as of 11/17/2023 - Reviewed 11/17/2023   Allergen  Reaction Noted   • Aripiprazole Other (See Comments) 07/07/2023   • Budesonide ANXIETY 03/06/2023       History:  Social History     Tobacco Use   • Smoking status: Never   • Smokeless tobacco: Never   Vaping Use   • Vaping Use: never used   Substance Use Topics   • Alcohol use: Yes     Alcohol/week: 3.0 standard drinks of alcohol     Types: 3 Standard drinks or equivalent per week   • Drug use: No        Review of systems:   Constitutional: Denies weight loss, decreased appetite, fatigue, fevers or chills.  GI:  Denies abdominal pain, nausea, vomiting, improved dysphagia, no hematemesis, hematochezia, melena, diarrhea, constipation, + daily heartburn.    Objective:   Visit Vitals  /84 (BP Location: LUE - Left upper extremity, Patient Position: Sitting, Cuff Size: Regular)   Pulse 78   Ht 5' 9\" (1.753 m)   Wt 98 kg (216 lb)   BMI 31.90 kg/m²     Body mass index is 31.9 kg/m².  Constitutional:  Well developed, well-nourished, no acute distress.   Respiratory:  No respiratory distress, breath sounds clear bilaterally, no adventitious breath sounds.  Cardiovascular:  Regular rate and rhythm. S1, S2. No apparent murmurs.  GI:  Soft, nondistended, normal bowel sounds, nontender, no hepatosplenomegaly, no mass, no rebound, no guarding.  Neurologic:  Alert & oriented x 3.  Psychiatric:  Speech and behavior appropriate. Affect normal.     Labs  No visits with results within 1 Month(s) from this visit.   Latest known visit with results is:   Lab Services on 06/07/2023   Component Date Value   • C. Difficile Toxin PCR 06/07/2023 Not Detected    • Campylobacter species 06/07/2023 Not Detected    • Plesiomonas shigelloides 06/07/2023 Not Detected    • Salmonella species 06/07/2023 Not Detected    • Vibrio species 06/07/2023 Not Detected    • Vibrio cholerae 06/07/2023 Not Detected    • Yersinia enterocolitica 06/07/2023 Not Detected    • Enteroaggregative E. col* 06/07/2023 Not Detected    • Enteropathogenic E. coli*  06/07/2023 Not Detected    • Enterotoxigenic Escheric* 06/07/2023 Not Detected    • Shiga-like toxin-produci* 06/07/2023 Not Detected    • Shigella species 06/07/2023 Not Detected    • Cryptosporidium species 06/07/2023 Not Detected    • Cyclospora cayetanensis 06/07/2023 Not Detected    • Entamoeba histolytica 06/07/2023 Not Detected    • Giardia lamblia 06/07/2023 Not Detected    • Adenovirus 40/41 06/07/2023 Not Detected    • Astrovirus 06/07/2023 Not Detected    • Norovirus GI/GII 06/07/2023 Not Detected    • Rotavirus A 06/07/2023 Not Detected    • Sapovirus 06/07/2023 Not Detected    • Calprotectin, Fecal 06/07/2023 <27        Assessment:  1.   Encounter Diagnoses   Name Primary?   • Eosinophilic esophagitis Yes   • GERD without esophagitis         Plan:  Orders Placed This Encounter   • famotidine (PEPCID) 40 MG tablet   · famotidine 40 mg po BID  ·  Continue Dupixent 300 mg bi-weekly for eosinophilic esophagitis.  ·  Recommend avoiding caffeine, dairy products, and fatty/fried foods.  Discussed the etiology of GERD with patient.  Encouraged lifestyle changes and the need for a lifelong regimen of medications to help reduce symptoms.  Discussed the potential for serious complications if symptoms are not resolved. Discussed raising the head of the bed 4 to 6 inches; avoiding chocolate, coffee, peppermint, fruit juices, tomatoes, greasy and spicy foods.  Encouraged moderation of alcoholic beverages, and avoidance of smoking.    Return in about 6 months (around 5/17/2024).     Please contact my office with any questions, concerns, or new or worsening symptoms.   Ashok is in agreement with plan.    Total time I spent today on this appointment is 32 minutes.    Collaborating physician: ZENAIDA Hammonds   DISCHARGE

## 2024-03-06 ENCOUNTER — EMERGENCY (EMERGENCY)
Facility: HOSPITAL | Age: 34
LOS: 1 days | Discharge: HOME | End: 2024-03-06
Attending: EMERGENCY MEDICINE | Admitting: EMERGENCY MEDICINE
Payer: MEDICAID

## 2024-03-06 VITALS
HEART RATE: 84 BPM | RESPIRATION RATE: 16 BRPM | SYSTOLIC BLOOD PRESSURE: 120 MMHG | DIASTOLIC BLOOD PRESSURE: 81 MMHG | OXYGEN SATURATION: 99 % | TEMPERATURE: 98 F

## 2024-03-06 VITALS
RESPIRATION RATE: 18 BRPM | TEMPERATURE: 98 F | OXYGEN SATURATION: 97 % | HEART RATE: 77 BPM | SYSTOLIC BLOOD PRESSURE: 114 MMHG | DIASTOLIC BLOOD PRESSURE: 65 MMHG

## 2024-03-06 LAB
ALBUMIN SERPL ELPH-MCNC: 4.2 G/DL — SIGNIFICANT CHANGE UP (ref 3.3–5)
ALP SERPL-CCNC: 90 U/L — SIGNIFICANT CHANGE UP (ref 40–120)
ALT FLD-CCNC: 15 U/L — SIGNIFICANT CHANGE UP (ref 4–33)
ANION GAP SERPL CALC-SCNC: 10 MMOL/L — SIGNIFICANT CHANGE UP (ref 7–14)
AST SERPL-CCNC: 18 U/L — SIGNIFICANT CHANGE UP (ref 4–32)
BILIRUB SERPL-MCNC: 0.2 MG/DL — SIGNIFICANT CHANGE UP (ref 0.2–1.2)
BUN SERPL-MCNC: 12 MG/DL — SIGNIFICANT CHANGE UP (ref 7–23)
CALCIUM SERPL-MCNC: 9.3 MG/DL — SIGNIFICANT CHANGE UP (ref 8.4–10.5)
CHLORIDE SERPL-SCNC: 106 MMOL/L — SIGNIFICANT CHANGE UP (ref 98–107)
CO2 SERPL-SCNC: 23 MMOL/L — SIGNIFICANT CHANGE UP (ref 22–31)
CREAT SERPL-MCNC: 0.77 MG/DL — SIGNIFICANT CHANGE UP (ref 0.5–1.3)
CRP SERPL-MCNC: 4.6 MG/L — SIGNIFICANT CHANGE UP
EGFR: 104 ML/MIN/1.73M2 — SIGNIFICANT CHANGE UP
ERYTHROCYTE [SEDIMENTATION RATE] IN BLOOD: 18 MM/HR — SIGNIFICANT CHANGE UP (ref 4–25)
GLUCOSE SERPL-MCNC: 111 MG/DL — HIGH (ref 70–99)
HCT VFR BLD CALC: 41.7 % — SIGNIFICANT CHANGE UP (ref 34.5–45)
HGB BLD-MCNC: 14.1 G/DL — SIGNIFICANT CHANGE UP (ref 11.5–15.5)
MCHC RBC-ENTMCNC: 30.7 PG — SIGNIFICANT CHANGE UP (ref 27–34)
MCHC RBC-ENTMCNC: 33.8 GM/DL — SIGNIFICANT CHANGE UP (ref 32–36)
MCV RBC AUTO: 90.8 FL — SIGNIFICANT CHANGE UP (ref 80–100)
NRBC # BLD: 0 /100 WBCS — SIGNIFICANT CHANGE UP (ref 0–0)
NRBC # FLD: 0 K/UL — SIGNIFICANT CHANGE UP (ref 0–0)
PLATELET # BLD AUTO: 282 K/UL — SIGNIFICANT CHANGE UP (ref 150–400)
POTASSIUM SERPL-MCNC: 3.5 MMOL/L — SIGNIFICANT CHANGE UP (ref 3.5–5.3)
POTASSIUM SERPL-SCNC: 3.5 MMOL/L — SIGNIFICANT CHANGE UP (ref 3.5–5.3)
PROT SERPL-MCNC: 8 G/DL — SIGNIFICANT CHANGE UP (ref 6–8.3)
RBC # BLD: 4.59 M/UL — SIGNIFICANT CHANGE UP (ref 3.8–5.2)
RBC # FLD: 11.8 % — SIGNIFICANT CHANGE UP (ref 10.3–14.5)
SODIUM SERPL-SCNC: 139 MMOL/L — SIGNIFICANT CHANGE UP (ref 135–145)
THYROGLOB AB FLD IA-ACNC: <20 IU/ML — SIGNIFICANT CHANGE UP
THYROGLOB AB SERPL-ACNC: <20 IU/ML — SIGNIFICANT CHANGE UP
THYROGLOB SERPL-MCNC: 1895 NG/ML — HIGH (ref 1.6–59.9)
THYROPEROXIDASE AB SERPL-ACNC: <10 IU/ML — SIGNIFICANT CHANGE UP
TSH SERPL-MCNC: 0.45 UIU/ML — SIGNIFICANT CHANGE UP (ref 0.27–4.2)
WBC # BLD: 4.99 K/UL — SIGNIFICANT CHANGE UP (ref 3.8–10.5)
WBC # FLD AUTO: 4.99 K/UL — SIGNIFICANT CHANGE UP (ref 3.8–10.5)

## 2024-03-06 PROCEDURE — 99284 EMERGENCY DEPT VISIT MOD MDM: CPT

## 2024-03-06 RX ORDER — FAMOTIDINE 10 MG/ML
1 INJECTION INTRAVENOUS
Qty: 8 | Refills: 0
Start: 2024-03-06 | End: 2024-03-09

## 2024-03-06 RX ORDER — LORATADINE 10 MG/1
10 TABLET ORAL ONCE
Refills: 0 | Status: COMPLETED | OUTPATIENT
Start: 2024-03-06 | End: 2024-03-06

## 2024-03-06 RX ORDER — FAMOTIDINE 10 MG/ML
20 INJECTION INTRAVENOUS ONCE
Refills: 0 | Status: COMPLETED | OUTPATIENT
Start: 2024-03-06 | End: 2024-03-06

## 2024-03-06 RX ORDER — LORATADINE 10 MG/1
1 TABLET ORAL
Qty: 30 | Refills: 0
Start: 2024-03-06

## 2024-03-06 RX ADMIN — FAMOTIDINE 20 MILLIGRAM(S): 10 INJECTION INTRAVENOUS at 09:15

## 2024-03-06 RX ADMIN — LORATADINE 10 MILLIGRAM(S): 10 TABLET ORAL at 09:15

## 2024-03-06 NOTE — ED PROVIDER NOTE - PATIENT PORTAL LINK FT
You can access the FollowMyHealth Patient Portal offered by [facility] by registering at the following website: [email or web address]. By joining [employer]’s FollowMyHealth portal, you will also be able to view your health information using other applications (apps) compatible with our system.

## 2024-03-06 NOTE — ED PROVIDER NOTE - ATTENDING APP SHARED VISIT CONTRIBUTION OF CARE
Catrina: Likely chronic urticaria w/ thyromegaly and some thyroid Sx. Check thyroid labs. H1 and H2 blockers. Refer to Derm and/or Endo (as needed).

## 2024-03-06 NOTE — ED ADULT NURSE NOTE - NSFALLUNIVINTERV_ED_ALL_ED
Bed/Stretcher in lowest position, wheels locked, appropriate side rails in place/Call bell, personal items and telephone in reach/Instruct patient to call for assistance before getting out of bed/chair/stretcher/Non-slip footwear applied when patient is off stretcher/[place] to call system/Physically safe environment - no spills, clutter or unnecessary equipment/Purposeful proactive rounding/Room/bathroom lighting operational, light cord in reach

## 2024-03-06 NOTE — ED ADULT NURSE NOTE - OBJECTIVE STATEMENT
Patient is a 32 yo female, denies phx, presenting with itchy rash x months. A&Ox4, no signs of distress, also reports anterior neck swelling x 1 year. Denies difficulty breathing or swallowing, airway is patent. Denies fever. 20G PIV placed to RAC, labs sent per orders. Medicated for itching. Pending results. Fall precautions maintained.

## 2024-03-06 NOTE — ED PROVIDER NOTE - NSFOLLOWUPINSTRUCTIONS_ED_ALL_ED_FT
Rest, drink plenty of fluids.  Advance activity as tolerated.  Continue all previously prescribed medications as directed.  Follow up with your primary care physician in 48-72 hours- bring copies of your results.  Return to the ER for worsening or persistent symptoms, and/or ANY NEW OR CONCERNING SYMPTOMS. If you have issues obtaining follow up, please call: [phone] to obtain a doctor or specialist who takes your insurance in your area.     Our Discharge Lounge will contact you for appointment with Endocrine and Dermatology.   Take Pepcid 20 mg twice a day as needed for indigestion.  Take Loratadine (Claritin) 10 mg once daily as needed for itchiness.

## 2024-03-06 NOTE — ED PROVIDER NOTE - PHYSICAL EXAMINATION
Well-appearing, well nourished, awake, alert, oriented to person, place, time/situation and in no apparent distress.    Airway patent. Neck supple. (B) anterior thyromegaly (not red/hot/tender).    Eyes without scleral injection. No jaundice.    Strong pulse.    Respirations unlabored.    Abdomen soft, non-tender, no guarding.    MSK: Spine appears normal, range of motion is not limited, no muscle or joint tenderness.    Alert and oriented, no gross motor or sensory deficits.    Skin: scattered areas diffusely of 1 cm to 5 cm slightly-raised bumps; one that looks chronic (darker, slightly rough texture) at top of R breast.    No SI/HI.

## 2024-03-06 NOTE — ED PROVIDER NOTE - PROGRESS NOTE DETAILS
JEROME LEO: Patient reassessed, sitting comfortably in chair in NAD, denies any complaints. labs reviewed, CRP wnl. TSH wnl. Discussed with attending, patient can be discharged home to f/u with endocrine and derm. The patient was given verbal and written discharge instructions. Specifically, instructions when to return to the ED and when to seek follow-up from their pcp was discussed. Any specialty follow-up was discussed, including how to make an appointment.  Instructions were discussed in simple, plain language and was understood by the patient. The patient understands that should their symptoms worsen or any new symptoms arise, they should return to the ED immediately for further evaluation. All pt's questions were answered. Patient verbalizes understanding.

## 2024-03-06 NOTE — ED PROVIDER NOTE - OBJECTIVE STATEMENT
Catrina: Months of itchy/painful, diffuse rash. Did not respond fully to steroids from urgent care. C/o anterior neck swelling. No stridor/SOB/problems w/ phonation/swallowing/breathing. Describes easy weight gain and hair falling out.

## 2024-03-06 NOTE — ED ADULT NURSE REASSESSMENT NOTE - NS ED NURSE REASSESS COMMENT FT1
Pt received to RW. pt is AxO 3 and ambulatory. pt respirations even and unlabored. Airway remains patent. Denies chest pain, headaches, SOB, fevers, or abdominal pain. plan of care ongoing.

## 2024-03-06 NOTE — ED ADULT TRIAGE NOTE - CHIEF COMPLAINT QUOTE
states" I am having swelling to my neck + rash on my forehead, breast, back, since few weeks and its getting worst " denies trouble swallowing, denies trouble breathing, c/o itchy painful rash all over the body since few months. swelling noted to the neck.

## 2024-03-07 LAB — IGE SERPL-ACNC: 16 KU/L — SIGNIFICANT CHANGE UP

## 2024-03-08 LAB
ANA PAT FLD IF-IMP: ABNORMAL
ANA TITR SER: ABNORMAL

## 2024-03-09 LAB — TSH RECEP AB FLD-ACNC: <1.1 IU/L — SIGNIFICANT CHANGE UP (ref 0–1.75)

## 2024-03-10 LAB
T3 SERPL-MCNC: 106 NG/DL — SIGNIFICANT CHANGE UP
T4 FREE SERPL-MCNC: 1.12 NG/DL — SIGNIFICANT CHANGE UP

## 2024-03-12 ENCOUNTER — APPOINTMENT (OUTPATIENT)
Dept: DERMATOLOGY | Facility: CLINIC | Age: 34
End: 2024-03-12
Payer: MEDICAID

## 2024-03-12 DIAGNOSIS — L20.9 ATOPIC DERMATITIS, UNSPECIFIED: ICD-10-CM

## 2024-03-12 DIAGNOSIS — L85.3 XEROSIS CUTIS: ICD-10-CM

## 2024-03-12 DIAGNOSIS — L73.9 FOLLICULAR DISORDER, UNSPECIFIED: ICD-10-CM

## 2024-03-12 DIAGNOSIS — L30.9 DERMATITIS, UNSPECIFIED: ICD-10-CM

## 2024-03-12 PROCEDURE — 99204 OFFICE O/P NEW MOD 45 MIN: CPT

## 2024-03-12 RX ORDER — TRIAMCINOLONE ACETONIDE 1 MG/G
0.1 OINTMENT TOPICAL
Qty: 1 | Refills: 1 | Status: ACTIVE | COMMUNITY
Start: 2024-03-12 | End: 1900-01-01

## 2024-03-12 RX ORDER — CLINDAMYCIN PHOSPHATE 10 MG/ML
1 LOTION TOPICAL
Qty: 1 | Refills: 5 | Status: ACTIVE | COMMUNITY
Start: 2024-03-12 | End: 1900-01-01

## 2024-03-12 RX ORDER — TACROLIMUS 1 MG/G
0.1 OINTMENT TOPICAL
Qty: 1 | Refills: 1 | Status: ACTIVE | COMMUNITY
Start: 2024-03-12 | End: 1900-01-01

## 2024-03-12 NOTE — ASSESSMENT
[FreeTextEntry1] : #Eczema - chest, back, neck new problem, uncertain prognosis -possible component of contact dermatitis given sudden onset/worsening -Start triamcinolone 0.1% ointment BID to affected areas on the body until rash resolved up to 2 weeks then transition to -protopic ointment bid to affected areas, SED -Proper medication use and side effects discussed, including cutaneous atrophy, telangiectasias, and striae. Avoid long-term use.  #Xerosis -chronic, flaring -I have discussed the chronic nature and course of this condition -gentle skin care, liberal emollients reviewed - hand out given  #Folliculitis, in setting of #Hirsutism -chronic, flaring -I have discussed the chronic nature and course of this condition -advised pt to consider LHR rather than tweezing -start clindamycin 1% lotion bid to affected areas prn flare   RTC 6-8 weeks

## 2024-03-12 NOTE — PHYSICAL EXAM
[Alert] : alert [Oriented x 3] : ~L oriented x 3 [Declined] : declined [FreeTextEntry3] : Focused exam: -dark brown and erythematous papules on the chin and jawline, with dark coarse terminal hairs -xerosis of the trunk, extremities -scaly pink patches and plaques on the neck, breasts, upper back

## 2024-03-12 NOTE — HISTORY OF PRESENT ILLNESS
[FreeTextEntry1] : rash [de-identified] : 34yo F presents for evaluation of rash and bumps on the face rash - ongoing for several months, very itchy. On the breasts, neck, upper back. was given triamcinolone 0.5% ointment which she uses a small amount of occasionally washes with dove soap, 1-2 times daily, moisturizing with aquaphor no hx of eczema previously wears perfume denies new products  also with bumps on the chin, has hairs, was told she has pcos tweezes the hairs

## 2024-03-18 LAB
THYROGLOB SERPL-MCNC: 1153.7 NG/ML — HIGH
THYROGLOB SERPL-MCNC: <1 IU/ML — SIGNIFICANT CHANGE UP
THYROGLOB SERPL-MCNC: SIGNIFICANT CHANGE UP NG/ML

## 2024-03-21 LAB
THYROGLOB SERPL-MCNC: 1614.4 NG/ML — HIGH
THYROGLOB SERPL-MCNC: <1 IU/ML — SIGNIFICANT CHANGE UP
THYROGLOB SERPL-MCNC: SIGNIFICANT CHANGE UP NG/ML

## 2024-03-28 ENCOUNTER — APPOINTMENT (OUTPATIENT)
Dept: ENDOCRINOLOGY | Facility: CLINIC | Age: 34
End: 2024-03-28

## 2024-04-22 ENCOUNTER — APPOINTMENT (OUTPATIENT)
Dept: DERMATOLOGY | Facility: CLINIC | Age: 34
End: 2024-04-22

## 2024-05-13 ENCOUNTER — EMERGENCY (EMERGENCY)
Facility: HOSPITAL | Age: 34
LOS: 1 days | Discharge: HOME | End: 2024-05-13
Admitting: EMERGENCY MEDICINE
Payer: MEDICAID

## 2024-05-13 VITALS
OXYGEN SATURATION: 99 % | HEART RATE: 83 BPM | DIASTOLIC BLOOD PRESSURE: 83 MMHG | RESPIRATION RATE: 16 BRPM | TEMPERATURE: 98 F | SYSTOLIC BLOOD PRESSURE: 121 MMHG

## 2024-05-13 PROCEDURE — 99284 EMERGENCY DEPT VISIT MOD MDM: CPT

## 2024-05-13 RX ORDER — IBUPROFEN 200 MG
600 TABLET ORAL ONCE
Refills: 0 | Status: COMPLETED | OUTPATIENT
Start: 2024-05-13 | End: 2024-05-13

## 2024-05-13 RX ADMIN — Medication 600 MILLIGRAM(S): at 15:57

## 2024-05-13 NOTE — ED ADULT TRIAGE NOTE - CHIEF COMPLAINT QUOTE
c/o sore throat X 1 day. pt voice hoarse. is speaking in full sentences. breathing unlabored. denies any past medical hx.

## 2024-05-13 NOTE — ED PROVIDER NOTE - PATIENT PORTAL LINK FT
You can access the FollowMyHealth Patient Portal offered by [facility] by registering at the following website: [email or web address]. By joining [employer]’s FollowMyHealth portal, you will also be able to view your health information using other applications (apps) compatible with our system.

## 2024-05-13 NOTE — ED PROVIDER NOTE - OBJECTIVE STATEMENT
32 y/o female no pmh c/o sore throat x 1 day. Pt admits to pain with swallowing solids and liquids. Pt denies taiking any OTC meds. Denies recent travel or sick contacts. Denies chst pain, sob, abd pain, n/v/d, fever or chills.

## 2024-05-13 NOTE — ED ADULT NURSE NOTE - NSFALLUNIVINTERV_ED_ALL_ED
Bed/Stretcher in lowest position, wheels locked, appropriate side rails in place/Call bell, personal items and telephone in reach/Instruct patient to call for assistance before getting out of bed/chair/stretcher/Non-slip footwear applied when patient is off stretcher/[place] to call system/Physically safe environment - no spills, clutter or unnecessary equipment/Purposeful proactive rounding/Room/bathroom lighting operational, light cord in reach

## 2024-05-13 NOTE — ED PROVIDER NOTE - NSFOLLOWUPINSTRUCTIONS_ED_ALL_ED_FT
Pharyngitis    WHAT YOU NEED TO KNOW:    What is pharyngitis? Pharyngitis, or sore throat, is inflammation of the tissues and structures in your pharynx (throat). Pharyngitis is most often caused by bacteria or a virus. Other causes include smoking, allergies, or acid reflux.    What are the signs and symptoms of pharyngitis? Signs and symptoms depend on the cause of your pharyngitis. You may have any of the following:    Sore throat or pain when you swallow    Fever, chills, and body aches    Hoarse or raspy voice    Cough, runny or stuffy nose, itchy or watery eyes    Headache    Upset stomach and loss of appetite    Whitish-yellow patches on the back of the throat    Tender, swollen lumps on the sides of the neck  How is pharyngitis diagnosed? Tell your healthcare provider about your symptoms and when they started. Your provider may look inside your throat and feel your neck. You may also need the following tests:    A throat culture may show which germ is causing your sore throat. A cotton swab is rubbed against the back of your throat.    Blood tests may be used to show if another medical condition is causing your sore throat.  How is pharyngitis treated? Viral pharyngitis will go away on its own without treatment. Your sore throat should start to feel better in 3 to 5 days. You may need medicines to decrease pain and swelling or to treat a bacterial infection.    How can I manage my symptoms?    Gargle salt water. Mix ¼ teaspoon salt in an 8 ounce glass of warm water and gargle. Do not swallow. Salt water may help decrease swelling in your throat.    Drink liquids as directed. You may need to drink more liquids than usual. Liquids may help soothe your throat and prevent dehydration. Ask how much liquid to drink each day and which liquids are best for you.    Use a cool mist humidifier. This will add moisture to the air and help decrease your cough.  Humidifier      Soothe your throat. Cough drops, ice, soft foods, or popsicles may help decrease throat pain.  How can I prevent the spread of pharyngitis? Cover your mouth and nose when you cough or sneeze. Do not share food or drinks. Wash your hands often. Use soap and water. If soap and water are unavailable, use an alcohol-based hand .  Handwashing    Call your local emergency number (911 in the [place]) if:    You have trouble breathing or swallowing because your throat is swollen.    When should I seek immediate care?    You are drooling because it hurts too much to swallow.    Your fever is higher than 102°F (39°C) or lasts longer than 3 days.    You are confused.    You taste blood.  When should I call my doctor?    Your throat pain gets worse.    You have a painful lump in your throat that does not go away after 5 days.    Your symptoms do not improve after 5 days.    You have questions or concerns about your condition or care.  CARE AGREEMENT:    You have the right to help plan your care. Learn about your health condition and how it may be treated. Discuss treatment options with your healthcare providers to decide what care you want to receive. You always have the right to refuse treatment.

## 2024-05-13 NOTE — ED ADULT NURSE NOTE - OBJECTIVE STATEMENT
Patient is a 34 yo female, denies phx, presenting with sore throat starting yesterday. A&Ox4, no signs of distress, airway patent, able to tolerate PO, denies fever, nausea, vomiting. Medicated for pain per orders. Fall precautions maintained.

## 2024-05-13 NOTE — ED PROVIDER NOTE - MUSCULOSKELETAL, MLM
Spine appears normal, range of motion is not limited, no muscle or joint tenderness

## 2024-05-13 NOTE — ED PROVIDER NOTE - CLINICAL SUMMARY MEDICAL DECISION MAKING FREE TEXT BOX
32 y/o female no pmh c/o sore throat x 1 day, worse with swallowing solids and liquids. Denies taking any OTC meds. No fever or chills or n/v. Pt is well appearing, nad, afebrile, tolerating secretions. throat + erythema, no exudate, no lymphadenopathy, uvula midline- likely viral pharyngitis, will swab for strep, nsaids, dc

## 2024-05-14 LAB — S PYO DNA THROAT QL NAA+PROBE: SIGNIFICANT CHANGE UP

## 2024-05-15 ENCOUNTER — APPOINTMENT (OUTPATIENT)
Dept: FAMILY MEDICINE | Facility: CLINIC | Age: 34
End: 2024-05-15
Payer: MEDICAID

## 2024-05-15 VITALS
SYSTOLIC BLOOD PRESSURE: 116 MMHG | RESPIRATION RATE: 18 BRPM | DIASTOLIC BLOOD PRESSURE: 81 MMHG | OXYGEN SATURATION: 97 % | WEIGHT: 187 LBS | TEMPERATURE: 97.8 F | BODY MASS INDEX: 33.13 KG/M2 | HEART RATE: 96 BPM | HEIGHT: 63 IN

## 2024-05-15 DIAGNOSIS — Z82.49 FAMILY HISTORY OF ISCHEMIC HEART DISEASE AND OTHER DISEASES OF THE CIRCULATORY SYSTEM: ICD-10-CM

## 2024-05-15 DIAGNOSIS — Z83.49 FAMILY HISTORY OF OTHER ENDOCRINE, NUTRITIONAL AND METABOLIC DISEASES: ICD-10-CM

## 2024-05-15 DIAGNOSIS — Z83.3 FAMILY HISTORY OF DIABETES MELLITUS: ICD-10-CM

## 2024-05-15 DIAGNOSIS — Z80.42 FAMILY HISTORY OF MALIGNANT NEOPLASM OF PROSTATE: ICD-10-CM

## 2024-05-15 PROCEDURE — 99204 OFFICE O/P NEW MOD 45 MIN: CPT

## 2024-05-15 NOTE — HEALTH RISK ASSESSMENT
[Yes] : Yes [2 - 4 times a month (2 pts)] : 2-4 times a month (2 points) [1 or 2 (0 pts)] : 1 or 2 (0 points) [Never (0 pts)] : Never (0 points) [No] : In the past 12 months have you used drugs other than those required for medical reasons? No [Little interest or pleasure doing things] : 1) Little interest or pleasure doing things [Feeling down, depressed, or hopeless] : 2) Feeling down, depressed, or hopeless [0] : 2) Feeling down, depressed, or hopeless: Not at all (0) [PHQ-2 Negative - No further assessment needed] : PHQ-2 Negative - No further assessment needed [Patient reported PAP Smear was normal] : Patient reported PAP Smear was normal [Current] : Current [0-4] : 0-4 [Audit-CScore] : 2 [TAD5Ooxyy] : 0 [PapSmearDate] : 01/23 [de-identified] : 2 years

## 2024-05-15 NOTE — HISTORY OF PRESENT ILLNESS
[de-identified] : Patient is a 33 year old F with a PMHx of PCOS, HLD, Obesity coming in to [facility] care.  Patient reports enlargement of the neck for many years.  Patient reports that thyroid U/S in 2017 which was normal.  Patient reports that her neck has enlarged since then with the left side larger than the right.  Patient reports associated weight gain and irregular periods.  Patient reports LMP was 3 months ago.  Patient went to [facility] ED and had thyroid test performed which showed elevated thyroidglobin but, normal TFTs. Patient denies chest pain, SOB, nausea, vomiting, fever, or palpitations at this time.

## 2024-05-15 NOTE — PHYSICAL EXAM
[Normal] : affect was normal and insight and judgment were intact [de-identified] : enlarged thyroid b/l with left greater than right

## 2024-05-16 DIAGNOSIS — E55.9 VITAMIN D DEFICIENCY, UNSPECIFIED: ICD-10-CM

## 2024-05-16 LAB
25(OH)D3 SERPL-MCNC: 14.1 NG/ML
ALBUMIN SERPL ELPH-MCNC: 4.4 G/DL
ALP BLD-CCNC: 89 U/L
ALT SERPL-CCNC: 20 U/L
ANION GAP SERPL CALC-SCNC: 15 MMOL/L
AST SERPL-CCNC: 21 U/L
BILIRUB SERPL-MCNC: 0.2 MG/DL
BUN SERPL-MCNC: 8 MG/DL
CALCIUM SERPL-MCNC: 9.1 MG/DL
CHLORIDE SERPL-SCNC: 105 MMOL/L
CHOLEST SERPL-MCNC: 252 MG/DL
CO2 SERPL-SCNC: 21 MMOL/L
CREAT SERPL-MCNC: 0.86 MG/DL
EGFR: 91 ML/MIN/1.73M2
ESTIMATED AVERAGE GLUCOSE: 108 MG/DL
GLUCOSE SERPL-MCNC: 96 MG/DL
HBA1C MFR BLD HPLC: 5.4 %
HCV AB SER QL: NONREACTIVE
HCV S/CO RATIO: 0.19 S/CO
HDLC SERPL-MCNC: 50 MG/DL
HIV1+2 AB SPEC QL IA.RAPID: NONREACTIVE
LDLC SERPL CALC-MCNC: 177 MG/DL
LH SERPL-ACNC: 12.1 IU/L
NONHDLC SERPL-MCNC: 202 MG/DL
POTASSIUM SERPL-SCNC: 4.2 MMOL/L
PROT SERPL-MCNC: 7.8 G/DL
SODIUM SERPL-SCNC: 140 MMOL/L
TRIGL SERPL-MCNC: 139 MG/DL

## 2024-05-16 RX ORDER — ERGOCALCIFEROL 1.25 MG/1
50000 CAPSULE ORAL
Qty: 12 | Refills: 0 | Status: ACTIVE | COMMUNITY
Start: 2024-05-16 | End: 1900-01-01

## 2024-05-17 LAB
FSH SERPL-MCNC: 5 IU/L
TESTOST FREE SERPL-MCNC: 1.1 PG/ML
TESTOST SERPL-MCNC: 25.5 NG/DL
THYROGLOB AB SERPL-ACNC: <20 IU/ML
THYROPEROXIDASE AB SERPL IA-ACNC: <10 IU/ML
TSH SERPL-ACNC: 0.56 UIU/ML

## 2024-05-20 ENCOUNTER — TRANSCRIPTION ENCOUNTER (OUTPATIENT)
Age: 34
End: 2024-05-20

## 2024-05-29 ENCOUNTER — APPOINTMENT (OUTPATIENT)
Dept: SURGERY | Facility: CLINIC | Age: 34
End: 2024-05-29

## 2024-07-01 ENCOUNTER — APPOINTMENT (OUTPATIENT)
Dept: ENDOCRINOLOGY | Facility: CLINIC | Age: 34
End: 2024-07-01
Payer: MEDICAID

## 2024-07-01 VITALS
HEART RATE: 71 BPM | HEIGHT: 63 IN | BODY MASS INDEX: 32.43 KG/M2 | TEMPERATURE: 98.6 F | OXYGEN SATURATION: 99 % | WEIGHT: 183 LBS | SYSTOLIC BLOOD PRESSURE: 117 MMHG | DIASTOLIC BLOOD PRESSURE: 77 MMHG

## 2024-07-01 DIAGNOSIS — E66.9 OBESITY, UNSPECIFIED: ICD-10-CM

## 2024-07-01 DIAGNOSIS — E04.9 NONTOXIC GOITER, UNSPECIFIED: ICD-10-CM

## 2024-07-01 DIAGNOSIS — E28.2 POLYCYSTIC OVARIAN SYNDROME: ICD-10-CM

## 2024-07-01 DIAGNOSIS — L68.0 HIRSUTISM: ICD-10-CM

## 2024-07-01 PROBLEM — N92.6 IRREGULAR PERIODS: Status: ACTIVE | Noted: 2024-07-01

## 2024-07-01 PROCEDURE — 99205 OFFICE O/P NEW HI 60 MIN: CPT

## 2024-07-17 ENCOUNTER — APPOINTMENT (OUTPATIENT)
Dept: FAMILY MEDICINE | Facility: CLINIC | Age: 34
End: 2024-07-17
Payer: MEDICAID

## 2024-07-17 VITALS
WEIGHT: 182 LBS | DIASTOLIC BLOOD PRESSURE: 80 MMHG | RESPIRATION RATE: 18 BRPM | SYSTOLIC BLOOD PRESSURE: 121 MMHG | BODY MASS INDEX: 32.25 KG/M2 | HEART RATE: 73 BPM | OXYGEN SATURATION: 97 % | TEMPERATURE: 98.1 F | HEIGHT: 63 IN

## 2024-07-17 DIAGNOSIS — E04.1 NONTOXIC SINGLE THYROID NODULE: ICD-10-CM

## 2024-07-17 DIAGNOSIS — N92.6 IRREGULAR MENSTRUATION, UNSPECIFIED: ICD-10-CM

## 2024-07-17 DIAGNOSIS — G89.29 LOW BACK PAIN, UNSPECIFIED: ICD-10-CM

## 2024-07-17 DIAGNOSIS — M54.50 LOW BACK PAIN, UNSPECIFIED: ICD-10-CM

## 2024-07-17 PROCEDURE — G2211 COMPLEX E/M VISIT ADD ON: CPT | Mod: NC,1L

## 2024-07-17 PROCEDURE — 99213 OFFICE O/P EST LOW 20 MIN: CPT

## 2024-07-18 LAB
ESTRADIOL SERPL-MCNC: 57 PG/ML
T4 FREE SERPL-MCNC: 1.1 NG/DL
TSH SERPL-ACNC: 0.48 UIU/ML

## 2024-07-22 RX ORDER — SEMAGLUTIDE 0.5 MG/.5ML
0.5 INJECTION, SOLUTION SUBCUTANEOUS
Qty: 1 | Refills: 3 | Status: DISCONTINUED | COMMUNITY
Start: 2024-07-01 | End: 2024-07-22

## 2024-07-22 RX ORDER — SEMAGLUTIDE 0.68 MG/ML
2 INJECTION, SOLUTION SUBCUTANEOUS
Qty: 1 | Refills: 6 | Status: ACTIVE | COMMUNITY
Start: 2024-07-22 | End: 1900-01-01

## 2024-07-31 ENCOUNTER — APPOINTMENT (OUTPATIENT)
Dept: SURGERY | Facility: CLINIC | Age: 34
End: 2024-07-31

## 2024-07-31 VITALS
TEMPERATURE: 98.2 F | HEIGHT: 63 IN | SYSTOLIC BLOOD PRESSURE: 122 MMHG | OXYGEN SATURATION: 99 % | WEIGHT: 180 LBS | DIASTOLIC BLOOD PRESSURE: 83 MMHG | BODY MASS INDEX: 31.89 KG/M2 | HEART RATE: 76 BPM

## 2024-07-31 DIAGNOSIS — E04.2 NONTOXIC MULTINODULAR GOITER: ICD-10-CM

## 2024-07-31 PROCEDURE — 10005 FNA BX W/US GDN 1ST LES: CPT

## 2024-07-31 PROCEDURE — 10006 FNA BX W/US GDN EA ADDL: CPT

## 2024-07-31 NOTE — HISTORY OF PRESENT ILLNESS
[de-identified] : PMHX of PCOS, HLD, Obesity, Thyroid nodules  Thyroid US 05/16/24: 4.9cm nodule right mid-lower pole needing FNA 4.1 cm nodule left upper-midpole warrants FNA [de-identified] : DEDE CONWAY is a 33 year old female with PMHX of PCOS, HLD, Obesity, Thyroid nodules who present in the office for initial consultation. Patient was referred by Apollo Mac with chief complaint of having multiple thyroid nodules since 2017. The patient denies any compressive like symptoms. On questioning the patient denied dysphagia, SOB, cough and voice changes. The patient denies any history of radiation or family history of thyroid problems or cancer. The patient is not a kramer. She had thyroid US on 05/16/24: 4.9 cm nodule right mid-lower pole. FNA 4.1 cm nodule left upper-mid pole. We recommend FNA of Right Thyroid nodule, Left Thyroid nodule and Isthmus nodule , based on in-office ultrasound. she agrees to proceed.

## 2024-07-31 NOTE — ASSESSMENT
[FreeTextEntry1] : DEDE CONWAY is a 33 year old female with PMHX of PCOS, HLD, Obesity, Thyroid nodules for FNA of multiple thyroids nodules   Thyroid Ultrasound   Right Lobe: right lower spongiform isoechoic nodule 3 cm by 2.4 cm , right inferior posterior 2.78 cm x 1.7 cm more solid.   Left Lobe: multiple nodules at least 3 nodules, the largest left superior 3.3 cm x 4 cm, left lateral mid 3 cm x 3.6 cm  Isthmus nodule spongiform 2.6 cm x 3.4 cm   No Suspicious lymph nodes seeing bilateral lateral neck   We recommend FNA of Right Thyroid nodule, Left Thyroid nodule and Isthmus nodule ,  based on in-office ultrasound. she agrees to proceed.   A. Right Thyroid nodule B. Isthmus Thyroid nodule C. Left Thyroid nodule

## 2024-07-31 NOTE — PLAN
[FreeTextEntry1] : PREOPERATIVE DIAGNOSIS:  Nodules POSTOPERATIVE DIAGNOSIS: Benign thyroid nodules PROCEDURE:  Informed consent was obtained. All the options, benefits and risks of the procedure discussed. A time out was performed and the patient was prepped and draped in sterile fashion. The patient was anesthesized with Lidocaine with epinephrine. Using ultrasound guidance and a 25 gauge needle, 4 passes were made into each nodule.  The patient tolerated the procedure well and there were no complications.   Patient was instructed to ice the area for any discomfort.   RTO in 2 weeks

## 2024-07-31 NOTE — PHYSICAL EXAM
[Normal Breath Sounds] : Normal breath sounds [Normal Heart Sounds] : normal heart sounds [Normal Rate and Rhythm] : normal rate and rhythm [No Rash or Lesion] : No rash or lesion [Alert] : alert [Oriented to Person] : oriented to person [Oriented to Place] : oriented to place [Oriented to Time] : oriented to time [Calm] : calm [de-identified] : The patient is alert, well-groomed  [de-identified] : Neck Trachea midline, markedly enlarge thyroid. multiple palpable thyroid nodules, no palpable lymphadenopathy, [de-identified] : full range of motion and no deformities appreciated.

## 2024-07-31 NOTE — PHYSICAL EXAM
[Normal Breath Sounds] : Normal breath sounds [Normal Heart Sounds] : normal heart sounds [Normal Rate and Rhythm] : normal rate and rhythm [No Rash or Lesion] : No rash or lesion [Alert] : alert [Oriented to Person] : oriented to person [Oriented to Place] : oriented to place [Oriented to Time] : oriented to time [Calm] : calm [de-identified] : The patient is alert, well-groomed  [de-identified] : Neck Trachea midline, markedly enlarge thyroid. multiple palpable thyroid nodules, no palpable lymphadenopathy, [de-identified] : full range of motion and no deformities appreciated.

## 2024-07-31 NOTE — DATA REVIEWED
[FreeTextEntry1] : DEDE CONWAY 1990 US THYROID 2024-05-15 5:50 PM [id number] KAUSHAL EVANSUR MSR5   RADIOLOGY REPORT   FINDINGS   FINDING History: Multinodular goiter.  Comparison: None.  Sonographic evaluation of the thyroid reveals a diffusely enlarged and heterogeneous echotexture.  ISTHMUS: Measures 16 mm. 2.6 x 2.3 x 1.3 cm isoechoic solid nodule with cystic spaces (TR 3)  RIGHT LOBE: Measures 7.6 cm x 2.5 cm x 3.3 cm.  Upper pole: 1.2 x 1.2 x 1.1 cm isoechoic solid nodule without calcification  Midlower pole: 4.9 x 2.7 x 1.9 cm isoechoic solid nodule (TR 3)   LEFT LOBE: Measures 8.6 cm x 4.6 cm x 4.8 cm.   Upper-midpole: 4.1 x 2.9 x 4.0 cm isoechoic solid nodule (TR 3)  CERVICAL LYMPH NODES: No significant adenopathy is identified.  IMPRESSION:  Multinodular thyroid goiter largest nodule at the right mid-lower pole measuring 4.9 cm (TR 3), this is amendable to ultrasound-guided FNA. Dominant left upper pole nodule may also be sampled at that time or follow-up based on biopsy results. Follow-up of additional thyroid nodules recommended based on biopsy results   ACR TI-RADS ACR Thyroid Imaging, Report and Data System (TI-RADS): White Paper of the ACR TI-RADS Committee (Journal of the American College of Radiology, Vol 14, Number 5, May 2017)-  TR1-2 ... 0 - 2 points ... Benign ... up to 2% cancer risk TR 3 ... 3 points ..... Mildly Suspicious ... FNA if >= 2.5 cm, follow if >= 1.5 cm  (Follow up 1, 3 and 5 years) .... up to 5% cancer risk TR4 ..... 4 - 6 points ... Moderately Suspicious ... FNA if >= 1.5 cm, follow if >= 1 cm  (Follow up 1, 2, 3 and 5 years) ... 5 to 20% cancer risk TR5 .... 7 points or more ... Highly Suspicious ... FNA if >= 1 cm, follow if >= 0.5 cm  (Follow up 1, 2, 3, 4, and 5 years) ... greater than 20% cancer risk  Significant growth is 20% increase in size in 2 dimensions and minimal increase of 2 mm   Electronically signed by: Neris Correa MD 5/16/2024 10:31 AM   Workstation: [id number]   Electronically Signed By: Neris Correa MD  Sign Date: 16-MAY-24 [facility]

## 2024-07-31 NOTE — HISTORY OF PRESENT ILLNESS
[de-identified] : PMHX of PCOS, HLD, Obesity, Thyroid nodules  Thyroid US 05/16/24: 4.9cm nodule right mid-lower pole needing FNA 4.1 cm nodule left upper-midpole warrants FNA [de-identified] : DEDE CONWAY is a 33 year old female with PMHX of PCOS, HLD, Obesity, Thyroid nodules who present in the office for initial consultation. Patient was referred by Apollo Mac with chief complaint of having multiple thyroid nodules since 2017. The patient denies any compressive like symptoms. On questioning the patient denied dysphagia, SOB, cough and voice changes. The patient denies any history of radiation or family history of thyroid problems or cancer. The patient is not a kramer. She had thyroid US on 05/16/24: 4.9 cm nodule right mid-lower pole. FNA 4.1 cm nodule left upper-mid pole. We recommend FNA of Right Thyroid nodule, Left Thyroid nodule and Isthmus nodule , based on in-office ultrasound. she agrees to proceed.

## 2024-07-31 NOTE — DATA REVIEWED
[FreeTextEntry1] : DEDE CONWAY 1990 US THYROID 2024-05-15 5:50 PM [id number] KAUSHAL EVANSUR MSR5   RADIOLOGY REPORT   FINDINGS   FINDING History: Multinodular goiter.  Comparison: None.  Sonographic evaluation of the thyroid reveals a diffusely enlarged and heterogeneous echotexture.  ISTHMUS: Measures 16 mm. 2.6 x 2.3 x 1.3 cm isoechoic solid nodule with cystic spaces (TR 3)  RIGHT LOBE: Measures 7.6 cm x 2.5 cm x 3.3 cm.  Upper pole: 1.2 x 1.2 x 1.1 cm isoechoic solid nodule without calcification  Midlower pole: 4.9 x 2.7 x 1.9 cm isoechoic solid nodule (TR 3)   LEFT LOBE: Measures 8.6 cm x 4.6 cm x 4.8 cm.   Upper-midpole: 4.1 x 2.9 x 4.0 cm isoechoic solid nodule (TR 3)  CERVICAL LYMPH NODES: No significant adenopathy is identified.  IMPRESSION:  Multinodular thyroid goiter largest nodule at the right mid-lower pole measuring 4.9 cm (TR 3), this is amendable to ultrasound-guided FNA. Dominant left upper pole nodule may also be sampled at that time or follow-up based on biopsy results. Follow-up of additional thyroid nodules recommended based on biopsy results   ACR TI-RADS ACR Thyroid Imaging, Report and Data System (TI-RADS): White Paper of the ACR TI-RADS Committee (Journal of the American College of Radiology, Vol 14, Number 5, May 2017)-  TR1-2 ... 0 - 2 points ... Benign ... up to 2% cancer risk TR 3 ... 3 points ..... Mildly Suspicious ... FNA if >= 2.5 cm, follow if >= 1.5 cm  (Follow up 1, 3 and 5 years) .... up to 5% cancer risk TR4 ..... 4 - 6 points ... Moderately Suspicious ... FNA if >= 1.5 cm, follow if >= 1 cm  (Follow up 1, 2, 3 and 5 years) ... 5 to 20% cancer risk TR5 .... 7 points or more ... Highly Suspicious ... FNA if >= 1 cm, follow if >= 0.5 cm  (Follow up 1, 2, 3, 4, and 5 years) ... greater than 20% cancer risk  Significant growth is 20% increase in size in 2 dimensions and minimal increase of 2 mm   Electronically signed by: Neris Correa MD 5/16/2024 10:31 AM   Workstation: [id number]   Electronically Signed By: Neris Correa MD  Sign Date: 16-MAY-24 [facility]

## 2024-08-01 LAB — FNA, THYROID: NORMAL

## 2024-08-14 ENCOUNTER — APPOINTMENT (OUTPATIENT)
Dept: SURGERY | Facility: CLINIC | Age: 34
End: 2024-08-14

## 2024-08-14 VITALS
DIASTOLIC BLOOD PRESSURE: 74 MMHG | HEIGHT: 63 IN | BODY MASS INDEX: 32.07 KG/M2 | HEART RATE: 99 BPM | TEMPERATURE: 97 F | OXYGEN SATURATION: 100 % | WEIGHT: 181 LBS | SYSTOLIC BLOOD PRESSURE: 108 MMHG

## 2024-08-14 DIAGNOSIS — E04.2 NONTOXIC MULTINODULAR GOITER: ICD-10-CM

## 2024-08-14 PROCEDURE — 99203 OFFICE O/P NEW LOW 30 MIN: CPT

## 2024-08-14 NOTE — HISTORY OF PRESENT ILLNESS
[de-identified] : DEDE CONWAY is a 33 year old female with PMHX of PCOS, HLD, Obesity, Thyroid nodules for FNA of Right Thyroid nodule, Left Thyroid nodule and Isthmus nodule pathology results are consistent with BENIGN FINDINGS (Category II).  Today patient is doing well, offers no complaints. Denies any discomfort. Patient has an enlarged thyroid, Results of her recent FNA discussed in detail. she was informed of significance of findings. Patient we recommend radiofrequency ablation of thyroid nodule or thyroidectomy. All of the options, risks and benefits were explained. Patient reports that she is a kramer.  Patient does not wish to pursue surgery at this time.

## 2024-08-14 NOTE — ASSESSMENT
[FreeTextEntry1] : DEDE CONWAY is a 33 year old female with PMHX of PCOS, HLD, Obesity, Thyroid nodules for FNA of multiple thyroids nodules.  Today patient is doing well, offers no complaints. Denies any discomfort. Patient has an enlarged thyroid, Results of her FNA discussed in detail. She was informed of significance of findings. Patient we recommend radiofrequency ablation of thyroid nodule or thyroidectomy. All of the options, risks and benefits were explained. Patient reports that she is a kramer. Patient does not wish to pursue surgery at this time.  Patient was referred to ENT for possible radiofrequency ablation of thyroid nodule

## 2024-08-14 NOTE — PHYSICAL EXAM
[Normal Breath Sounds] : Normal breath sounds [Normal Heart Sounds] : normal heart sounds [Normal Rate and Rhythm] : normal rate and rhythm [No Rash or Lesion] : No rash or lesion [Alert] : alert [Oriented to Person] : oriented to person [Oriented to Place] : oriented to place [Oriented to Time] : oriented to time [Calm] : calm [de-identified] : The patient is alert, well-groomed  [de-identified] : Neck Trachea midline, markedly enlarge thyroid. multiple palpable thyroid nodules, no palpable lymphadenopathy, [de-identified] : full range of motion and no deformities appreciated.

## 2024-08-14 NOTE — HISTORY OF PRESENT ILLNESS
Normal sinus rhythm 66 bpm, old anterior septal infarct [de-identified] : DEDE CONWAY is a 33 year old female with PMHX of PCOS, HLD, Obesity, Thyroid nodules for FNA of Right Thyroid nodule, Left Thyroid nodule and Isthmus nodule pathology results are consistent with BENIGN FINDINGS (Category II).  Today patient is doing well, offers no complaints. Denies any discomfort. Patient has an enlarged thyroid, Results of her recent FNA discussed in detail. she was informed of significance of findings. Patient we recommend radiofrequency ablation of thyroid nodule or thyroidectomy. All of the options, risks and benefits were explained. Patient reports that she is a kramer.  Patient does not wish to pursue surgery at this time.

## 2024-08-14 NOTE — PHYSICAL EXAM
[Normal Breath Sounds] : Normal breath sounds [Normal Heart Sounds] : normal heart sounds [Normal Rate and Rhythm] : normal rate and rhythm [No Rash or Lesion] : No rash or lesion [Alert] : alert [Oriented to Person] : oriented to person [Oriented to Place] : oriented to place [Oriented to Time] : oriented to time [Calm] : calm [de-identified] : The patient is alert, well-groomed  [de-identified] : Neck Trachea midline, markedly enlarge thyroid. multiple palpable thyroid nodules, no palpable lymphadenopathy, [de-identified] : full range of motion and no deformities appreciated.

## 2024-09-23 ENCOUNTER — EMERGENCY (EMERGENCY)
Facility: HOSPITAL | Age: 34
LOS: 1 days | Discharge: HOME | End: 2024-09-23
Attending: EMERGENCY MEDICINE | Admitting: EMERGENCY MEDICINE
Payer: MEDICAID

## 2024-09-23 VITALS
OXYGEN SATURATION: 99 % | SYSTOLIC BLOOD PRESSURE: 123 MMHG | RESPIRATION RATE: 16 BRPM | DIASTOLIC BLOOD PRESSURE: 84 MMHG | WEIGHT: 184.97 LBS | HEIGHT: 63 IN | HEART RATE: 77 BPM | TEMPERATURE: 98 F

## 2024-09-23 VITALS
RESPIRATION RATE: 18 BRPM | TEMPERATURE: 99 F | OXYGEN SATURATION: 98 % | DIASTOLIC BLOOD PRESSURE: 70 MMHG | SYSTOLIC BLOOD PRESSURE: 110 MMHG | HEART RATE: 61 BPM

## 2024-09-23 LAB
ALBUMIN SERPL ELPH-MCNC: 4.3 G/DL — SIGNIFICANT CHANGE UP (ref 3.3–5)
ALP SERPL-CCNC: 84 U/L — SIGNIFICANT CHANGE UP (ref 40–120)
ALT FLD-CCNC: 19 U/L — SIGNIFICANT CHANGE UP (ref 4–33)
ANION GAP SERPL CALC-SCNC: 14 MMOL/L — SIGNIFICANT CHANGE UP (ref 7–14)
AST SERPL-CCNC: 20 U/L — SIGNIFICANT CHANGE UP (ref 4–32)
BASOPHILS # BLD AUTO: 0.02 K/UL — SIGNIFICANT CHANGE UP (ref 0–0.2)
BASOPHILS NFR BLD AUTO: 0.4 % — SIGNIFICANT CHANGE UP (ref 0–2)
BILIRUB SERPL-MCNC: 0.3 MG/DL — SIGNIFICANT CHANGE UP (ref 0.2–1.2)
BUN SERPL-MCNC: 8 MG/DL — SIGNIFICANT CHANGE UP (ref 7–23)
CALCIUM SERPL-MCNC: 9.6 MG/DL — SIGNIFICANT CHANGE UP (ref 8.4–10.5)
CHLORIDE SERPL-SCNC: 104 MMOL/L — SIGNIFICANT CHANGE UP (ref 98–107)
CO2 SERPL-SCNC: 21 MMOL/L — LOW (ref 22–31)
CREAT SERPL-MCNC: 0.77 MG/DL — SIGNIFICANT CHANGE UP (ref 0.5–1.3)
EGFR: 103 ML/MIN/1.73M2 — SIGNIFICANT CHANGE UP
EOSINOPHIL # BLD AUTO: 0.26 K/UL — SIGNIFICANT CHANGE UP (ref 0–0.5)
EOSINOPHIL NFR BLD AUTO: 4.9 % — SIGNIFICANT CHANGE UP (ref 0–6)
GLUCOSE SERPL-MCNC: 85 MG/DL — SIGNIFICANT CHANGE UP (ref 70–99)
HCG SERPL-ACNC: <1 MIU/ML — SIGNIFICANT CHANGE UP
HCT VFR BLD CALC: 42 % — SIGNIFICANT CHANGE UP (ref 34.5–45)
HGB BLD-MCNC: 14.4 G/DL — SIGNIFICANT CHANGE UP (ref 11.5–15.5)
IANC: 2.25 K/UL — SIGNIFICANT CHANGE UP (ref 1.8–7.4)
IMM GRANULOCYTES NFR BLD AUTO: 0.4 % — SIGNIFICANT CHANGE UP (ref 0–0.9)
LYMPHOCYTES # BLD AUTO: 2.39 K/UL — SIGNIFICANT CHANGE UP (ref 1–3.3)
LYMPHOCYTES # BLD AUTO: 45 % — HIGH (ref 13–44)
MCHC RBC-ENTMCNC: 30.6 PG — SIGNIFICANT CHANGE UP (ref 27–34)
MCHC RBC-ENTMCNC: 34.3 GM/DL — SIGNIFICANT CHANGE UP (ref 32–36)
MCV RBC AUTO: 89.2 FL — SIGNIFICANT CHANGE UP (ref 80–100)
MONOCYTES # BLD AUTO: 0.37 K/UL — SIGNIFICANT CHANGE UP (ref 0–0.9)
MONOCYTES NFR BLD AUTO: 7 % — SIGNIFICANT CHANGE UP (ref 2–14)
NEUTROPHILS # BLD AUTO: 2.25 K/UL — SIGNIFICANT CHANGE UP (ref 1.8–7.4)
NEUTROPHILS NFR BLD AUTO: 42.3 % — LOW (ref 43–77)
NRBC # BLD: 0 /100 WBCS — SIGNIFICANT CHANGE UP (ref 0–0)
NRBC # FLD: 0 K/UL — SIGNIFICANT CHANGE UP (ref 0–0)
PLATELET # BLD AUTO: 275 K/UL — SIGNIFICANT CHANGE UP (ref 150–400)
POTASSIUM SERPL-MCNC: 3.7 MMOL/L — SIGNIFICANT CHANGE UP (ref 3.5–5.3)
POTASSIUM SERPL-SCNC: 3.7 MMOL/L — SIGNIFICANT CHANGE UP (ref 3.5–5.3)
PROT SERPL-MCNC: 7.6 G/DL — SIGNIFICANT CHANGE UP (ref 6–8.3)
RBC # BLD: 4.71 M/UL — SIGNIFICANT CHANGE UP (ref 3.8–5.2)
RBC # FLD: 11.6 % — SIGNIFICANT CHANGE UP (ref 10.3–14.5)
SODIUM SERPL-SCNC: 139 MMOL/L — SIGNIFICANT CHANGE UP (ref 135–145)
WBC # BLD: 5.31 K/UL — SIGNIFICANT CHANGE UP (ref 3.8–10.5)
WBC # FLD AUTO: 5.31 K/UL — SIGNIFICANT CHANGE UP (ref 3.8–10.5)

## 2024-09-23 PROCEDURE — 99284 EMERGENCY DEPT VISIT MOD MDM: CPT

## 2024-09-23 RX ORDER — DIPHENHYDRAMINE HCL 50 MG
50 CAPSULE ORAL ONCE
Refills: 0 | Status: COMPLETED | OUTPATIENT
Start: 2024-09-23 | End: 2024-09-23

## 2024-09-23 RX ORDER — HYDROCORTISONE 1 %
1 OINTMENT (GRAM) TOPICAL
Qty: 20 | Refills: 0
Start: 2024-09-23 | End: 2024-10-20

## 2024-09-23 RX ORDER — HYDROCORTISONE 1 %
1 OINTMENT (GRAM) TOPICAL ONCE
Refills: 0 | Status: COMPLETED | OUTPATIENT
Start: 2024-09-23 | End: 2024-09-23

## 2024-09-23 RX ADMIN — Medication 1 APPLICATION(S): at 22:01

## 2024-09-23 RX ADMIN — Medication 50 MILLIGRAM(S): at 19:44

## 2024-09-23 NOTE — ED PROVIDER NOTE - OBJECTIVE STATEMENT
34-year-old female past medical history of eczema presents for rash and body itching for approximately 1 week.  Patient states rash is located on left ear, neck, upper chest.  Also reports itching of the lips.  Patient reports seeing a dermatologist several months ago for similar rash (although less intense) and was given diagnosis of eczema and prescribed cream.  Patient used this medication for approximately 1 week but stopped as she felt rash improved.  She denies recent travel or sick contacts.  No known contacts with similar rash.  No new medications.  No new soaps or detergents.  Denies insect bites.  No known allergies.  Denies tobacco, alcohol, illicit drug use.  Denies fever, chills, nausea, vomiting, cough, dysuria, diarrhea.

## 2024-09-23 NOTE — ED PROVIDER NOTE - PATIENT PORTAL LINK FT
You can access the FollowMyHealth Patient Portal offered by [facility] by registering at the following website: [email or web address]. By joining [employer]’s FollowMyHealth portal, you will also be able to view your health information using other applications (apps) compatible with our system.

## 2024-09-23 NOTE — ED PROVIDER NOTE - CLINICAL SUMMARY MEDICAL DECISION MAKING FREE TEXT BOX
34-year-old female past medical history of eczema presents for rash and body itching for approximately 1 week.  Vital signs stable, afebrile.  Exam nontoxic-appearing, eczematous appearing lesions on exam.  Given history of described eczema, this could be a recurrence of condition.  Presentation is not consistent with bacterial infection, SJS, or TEN at this time.  Will send labs to evaluate for hepatic function, provide supportive care.  Disposition pending.

## 2024-09-23 NOTE — ED ADULT NURSE NOTE - NSFALLUNIVINTERV_ED_ALL_ED
Bed/Stretcher in lowest position, wheels locked, appropriate side rails in place/Call bell, personal items and telephone in reach/Instruct patient to call for assistance before getting out of bed/chair/stretcher/Non-slip footwear applied when patient is off stretcher/[place] to call system/Physically safe environment - no spills, clutter or unnecessary equipment/Purposeful proactive rounding/Room/bathroom lighting operational, light cord in reach

## 2024-09-23 NOTE — ED PROVIDER NOTE - NSFOLLOWUPINSTRUCTIONS_ED_ALL_ED_FT
You were seen in the emergency department for a rash.  Your lab work did not reveal any cause of the rash.  Please take prescribed antihistamine and steroid ointment as prescribed.  Please return to care for worsening symptoms, fever, chills.  Please follow-up with the dermatologist as early as able.

## 2024-09-23 NOTE — ED ADULT NURSE NOTE - OBJECTIVE STATEMENT
This is a 34 y/0 female alert and oriented x 4, With no pertinent medical history. Complaining of itchiness all over since yesterday, Not in any distress, no rashes noted, chest is clear bilaterally, abdomen is soft and on tender. Reports she used a new perfume. No sob, Appears well. Breathing is even and unlabored. IV initiated on left hand blood work sent to lab, waiting for results, due meds given.

## 2024-09-23 NOTE — ED PROVIDER NOTE - PHYSICAL EXAMINATION
GEN:  Non-toxic appearing, non-distressed, speaking full sentences, non-diaphoretic, AAOx3  HEENT:  NCAT, neck supple, EOMI, PERRLA, sclera anicteric, no conjunctival pallor or injection, no stridor, normal voice, no tonsillar exudate, uvula midline; oral mucosa without lesion and overall normal appearing   CV:  regular rhythm and rate, s1/s2 audible, no murmurs, rubs or gallops, peripheral pulses 2+ and symmetric  PULM:  non-labored respirations, lungs clear to auscultation bilaterally, no wheezes, crackles or rales  MSK:  no gross deformity, non-tender extremities and joints, range of motion grossly normal appearing, no extremity edema, extremities warm and well perfused   NEURO:  AAOx3, CN II-XII intact, motor 5/5 in upper and lower extremities bilaterally, sensation grossly intact in extremities and trunk, no gait deficit  SKIN:  superior aspect of left helix with dry, scaly appearance, scant serious and non-purulent discharge; dry and scaly appearing lesions to posterior neck and upper anterior chest area; no involvement of palms and soles

## 2024-10-01 ENCOUNTER — APPOINTMENT (OUTPATIENT)
Dept: ENDOCRINOLOGY | Facility: CLINIC | Age: 34
End: 2024-10-01
Payer: MEDICAID

## 2024-10-01 DIAGNOSIS — E28.2 POLYCYSTIC OVARIAN SYNDROME: ICD-10-CM

## 2024-10-01 DIAGNOSIS — E66.9 OBESITY, UNSPECIFIED: ICD-10-CM

## 2024-10-01 DIAGNOSIS — E04.2 NONTOXIC MULTINODULAR GOITER: ICD-10-CM

## 2024-10-01 PROCEDURE — 99443: CPT

## 2024-10-09 NOTE — REASON FOR VISIT
[Home] : at home, [unfilled] , at the time of the visit. [Medical Office: ([facility])___] : at the medical office located in  [Verbal consent obtained from patient] : the patient, [unfilled] [Follow - Up] : a follow-up visit [Weight Management/Obesity] : weight management/obesity [PCOS] : PCOS

## 2024-10-09 NOTE — ASSESSMENT
[FreeTextEntry1] : PCOS:+irregular menses +hirsutism +acne +polycystic ovaries  Clinical diagnosis of PCOS Does not tolerate metformin  OCP causes excessive bleeding and weight gain  Candidate for GLP-1 to aid with insulin resistance and weight loss- but not covered samples of Ozempic left for      Thyroid nodules: FNA done- benign

## 2024-10-09 NOTE — HISTORY OF PRESENT ILLNESS
[FreeTextEntry1] :   Interval Hx: 33 year old female presenting for thyroid nodule and PCOS f/u      THYROID DISEASE HISTORY:  Risk Factors: Family or Personal Hx of thyroid disease? 2018 thyroid nodules no hx of thyroid disease, no known family hx  Goiter or Hx of Goiter? yes , MNG  Prior of current use of thyroid medication? no  Hx of autoimmune disease? none  Recent iodine exposure? none    Clinical Symptoms: Dry Skin,  Patient reports enlargement of the neck for many years. Patient reports that thyroid U/S in 2017 which was normal. Patient reports that her neck has enlarged since then with the left side larger than the right.  Patient reports associated weight gain and irregular periods.   Thyroid US 05/16/24: 4.9cm nodule right mid-lower pole needing FNA 4.1cm nodule left upper-midpole warranted FNA  Had FNA done which was benign   Patient tolerated Ozempic and used sample. Wegovy and weight management medications not covered by insurance. She is unable to tolerate metformin. SHe is frustrated and reports gaining back all her weight.     PCOS Menses age: 15/16 cycle: once a year, most 3 x a year  LMP: spotting - 31 days ago  Hirsutism: chin hair, side burns  Hair loss: a little  acne: no issues  weight: 15 pound gained 3 weeks, attempted multiple diets  Ovarian US: polycystic kidneys  Fertility: no  Family Hx: twin sister  tried metformin- nauseous on metformin  attempted OCP - too much bleeding and weight gain       Follow up care: PCP:Dr. Almanza

## 2024-10-11 ENCOUNTER — APPOINTMENT (OUTPATIENT)
Dept: FAMILY MEDICINE | Facility: CLINIC | Age: 34
End: 2024-10-11
Payer: SUBSIDIZED

## 2024-10-11 ENCOUNTER — LABORATORY RESULT (OUTPATIENT)
Age: 34
End: 2024-10-11

## 2024-10-11 VITALS
HEIGHT: 63 IN | SYSTOLIC BLOOD PRESSURE: 113 MMHG | HEART RATE: 92 BPM | DIASTOLIC BLOOD PRESSURE: 78 MMHG | OXYGEN SATURATION: 100 % | RESPIRATION RATE: 18 BRPM | TEMPERATURE: 98.1 F | WEIGHT: 184 LBS | BODY MASS INDEX: 32.6 KG/M2

## 2024-10-11 DIAGNOSIS — K13.0 DISEASES OF LIPS: ICD-10-CM

## 2024-10-11 PROCEDURE — G2211 COMPLEX E/M VISIT ADD ON: CPT

## 2024-10-11 PROCEDURE — 99213 OFFICE O/P EST LOW 20 MIN: CPT

## 2024-10-11 RX ORDER — MUPIROCIN 20 MG/G
2 OINTMENT TOPICAL TWICE DAILY
Qty: 1 | Refills: 0 | Status: ACTIVE | COMMUNITY
Start: 2024-10-11 | End: 1900-01-01

## 2024-10-17 ENCOUNTER — APPOINTMENT (OUTPATIENT)
Dept: FAMILY MEDICINE | Facility: CLINIC | Age: 34
End: 2024-10-17

## 2024-10-19 LAB
A ALTERNATA IGE QN: <0.1 KUA/L
A FUMIGATUS IGE QN: <0.1 KUA/L
BERMUDA GRASS IGE QN: <0.1 KUA/L
BOXELDER IGE QN: <0.1 KUA/L
C HERBARUM IGE QN: <0.1 KUA/L
CALIF WALNUT IGE QN: <0.1 KUA/L
CAT DANDER IGE QN: 0.4 KUA/L
CMN PIGWEED IGE QN: <0.1 KUA/L
COMMON RAGWEED IGE QN: <0.1 KUA/L
COTTONWOOD IGE QN: <0.1 KUA/L
D FARINAE IGE QN: <0.1 KUA/L
D PTERONYSS IGE QN: <0.1 KUA/L
DEPRECATED A ALTERNATA IGE RAST QL: 0
DEPRECATED A FUMIGATUS IGE RAST QL: 0
DEPRECATED BERMUDA GRASS IGE RAST QL: 0
DEPRECATED BOXELDER IGE RAST QL: 0
DEPRECATED C HERBARUM IGE RAST QL: 0
DEPRECATED CAT DANDER IGE RAST QL: 1
DEPRECATED COMMON PIGWEED IGE RAST QL: 0
DEPRECATED COMMON RAGWEED IGE RAST QL: 0
DEPRECATED COTTONWOOD IGE RAST QL: 0
DEPRECATED D FARINAE IGE RAST QL: 0
DEPRECATED D PTERONYSS IGE RAST QL: 0
DEPRECATED DOG DANDER IGE RAST QL: NORMAL
DEPRECATED GOOSEFOOT IGE RAST QL: 0
DEPRECATED LONDON PLANE IGE RAST QL: 0
DEPRECATED MOUSE URINE PROT IGE RAST QL: 0
DEPRECATED MUGWORT IGE RAST QL: 0
DEPRECATED P NOTATUM IGE RAST QL: 0
DEPRECATED RED CEDAR IGE RAST QL: 0
DEPRECATED ROACH IGE RAST QL: 0
DEPRECATED SHEEP SORREL IGE RAST QL: 0
DEPRECATED SILVER BIRCH IGE RAST QL: 0
DEPRECATED TIMOTHY IGE RAST QL: 0
DEPRECATED WHITE ASH IGE RAST QL: 0
DEPRECATED WHITE OAK IGE RAST QL: 0
DOG DANDER IGE QN: 0.12 KUA/L
GOOSEFOOT IGE QN: <0.1 KUA/L
LONDON PLANE IGE QN: <0.1 KUA/L
MOUSE URINE PROT IGE QN: <0.1 KUA/L
MUGWORT IGE QN: <0.1 KUA/L
MULBERRY (T70) CLASS: 0
MULBERRY (T70) CONC: <0.1 KUA/L
P NOTATUM IGE QN: <0.1 KUA/L
RED CEDAR IGE QN: <0.1 KUA/L
ROACH IGE QN: <0.1 KUA/L
SHEEP SORREL IGE QN: <0.1 KUA/L
SILVER BIRCH IGE QN: <0.1 KUA/L
TIMOTHY IGE QN: <0.1 KUA/L
TOTAL IGE SMQN RAST: 55 KU/L
TREE ALLERG MIX1 IGE QL: 0
WHITE ASH IGE QN: <0.1 KUA/L
WHITE ELM IGE QN: 0
WHITE ELM IGE QN: <0.1 KUA/L
WHITE OAK IGE QN: <0.1 KUA/L

## 2024-10-28 ENCOUNTER — APPOINTMENT (OUTPATIENT)
Dept: DERMATOLOGY | Facility: CLINIC | Age: 34
End: 2024-10-28
Payer: MEDICAID

## 2024-10-28 ENCOUNTER — NON-APPOINTMENT (OUTPATIENT)
Age: 34
End: 2024-10-28

## 2024-10-28 DIAGNOSIS — L30.9 DERMATITIS, UNSPECIFIED: ICD-10-CM

## 2024-10-28 DIAGNOSIS — D48.5 NEOPLASM OF UNCERTAIN BEHAVIOR OF SKIN: ICD-10-CM

## 2024-10-28 DIAGNOSIS — L85.3 XEROSIS CUTIS: ICD-10-CM

## 2024-10-28 PROCEDURE — 99214 OFFICE O/P EST MOD 30 MIN: CPT | Mod: 25

## 2024-10-28 PROCEDURE — 11102 TANGNTL BX SKIN SINGLE LES: CPT

## 2024-11-18 ENCOUNTER — NON-APPOINTMENT (OUTPATIENT)
Age: 34
End: 2024-11-18

## 2024-12-04 ENCOUNTER — APPOINTMENT (OUTPATIENT)
Dept: ENDOCRINOLOGY | Facility: CLINIC | Age: 34
End: 2024-12-04
Payer: MEDICAID

## 2024-12-04 VITALS
DIASTOLIC BLOOD PRESSURE: 77 MMHG | WEIGHT: 183 LBS | RESPIRATION RATE: 18 BRPM | BODY MASS INDEX: 32.43 KG/M2 | OXYGEN SATURATION: 98 % | HEIGHT: 63 IN | SYSTOLIC BLOOD PRESSURE: 112 MMHG | HEART RATE: 120 BPM | TEMPERATURE: 97.5 F

## 2024-12-04 DIAGNOSIS — E28.2 POLYCYSTIC OVARIAN SYNDROME: ICD-10-CM

## 2024-12-04 PROCEDURE — 99213 OFFICE O/P EST LOW 20 MIN: CPT

## 2024-12-14 NOTE — ED CDU PROVIDER INITIAL DAY NOTE - NS ED ROS FT
ROS:  GENERAL: As per HPI   HEENT: As per HPI   CARDIAC: no chest pain  PULMONARY: no cough, no shortness of breath  GI: no abdominal pain, no nausea, no vomiting, no diarrhea, no constipation  SKIN: no rashes  NEURO: no headache, no weakness, no dizziness  MSK: No joint pain  All other systems reviewed as negative. Yes...

## 2024-12-18 ENCOUNTER — APPOINTMENT (OUTPATIENT)
Dept: DERMATOLOGY | Facility: CLINIC | Age: 34
End: 2024-12-18
Payer: MEDICAID

## 2024-12-18 VITALS — HEIGHT: 63 IN | WEIGHT: 183 LBS | BODY MASS INDEX: 32.43 KG/M2

## 2024-12-18 DIAGNOSIS — L70.9 ACNE, UNSPECIFIED: ICD-10-CM

## 2024-12-18 DIAGNOSIS — L30.9 DERMATITIS, UNSPECIFIED: ICD-10-CM

## 2024-12-18 DIAGNOSIS — L68.0 HIRSUTISM: ICD-10-CM

## 2024-12-18 PROCEDURE — 99214 OFFICE O/P EST MOD 30 MIN: CPT

## 2024-12-21 PROBLEM — L70.9 ACNE: Status: ACTIVE | Noted: 2024-12-21

## 2025-02-06 ENCOUNTER — APPOINTMENT (OUTPATIENT)
Dept: ENDOCRINOLOGY | Facility: CLINIC | Age: 35
End: 2025-02-06
Payer: MEDICAID

## 2025-02-06 VITALS
OXYGEN SATURATION: 98 % | DIASTOLIC BLOOD PRESSURE: 89 MMHG | TEMPERATURE: 97.3 F | HEART RATE: 101 BPM | RESPIRATION RATE: 18 BRPM | SYSTOLIC BLOOD PRESSURE: 136 MMHG | BODY MASS INDEX: 33.84 KG/M2 | WEIGHT: 191 LBS | HEIGHT: 63 IN

## 2025-02-06 DIAGNOSIS — N92.6 IRREGULAR MENSTRUATION, UNSPECIFIED: ICD-10-CM

## 2025-02-06 DIAGNOSIS — E04.2 NONTOXIC MULTINODULAR GOITER: ICD-10-CM

## 2025-02-06 DIAGNOSIS — E28.2 POLYCYSTIC OVARIAN SYNDROME: ICD-10-CM

## 2025-02-06 DIAGNOSIS — E66.9 OBESITY, UNSPECIFIED: ICD-10-CM

## 2025-02-06 PROCEDURE — 99214 OFFICE O/P EST MOD 30 MIN: CPT

## 2025-02-06 RX ORDER — TIRZEPATIDE 2.5 MG/.5ML
2.5 INJECTION, SOLUTION SUBCUTANEOUS
Qty: 1 | Refills: 6 | Status: ACTIVE | COMMUNITY
Start: 2025-02-06 | End: 1900-01-01

## 2025-03-26 ENCOUNTER — APPOINTMENT (OUTPATIENT)
Dept: DERMATOLOGY | Facility: CLINIC | Age: 35
End: 2025-03-26
Payer: MEDICAID

## 2025-03-26 PROCEDURE — 95044 PATCH/APPLICATION TESTS: CPT

## 2025-03-26 PROCEDURE — 99214 OFFICE O/P EST MOD 30 MIN: CPT | Mod: 25

## 2025-03-28 ENCOUNTER — APPOINTMENT (OUTPATIENT)
Dept: DERMATOLOGY | Facility: CLINIC | Age: 35
End: 2025-03-28
Payer: MEDICAID

## 2025-03-28 PROCEDURE — 99212 OFFICE O/P EST SF 10 MIN: CPT

## 2025-03-31 ENCOUNTER — APPOINTMENT (OUTPATIENT)
Dept: DERMATOLOGY | Facility: CLINIC | Age: 35
End: 2025-03-31

## 2025-03-31 PROCEDURE — 99215 OFFICE O/P EST HI 40 MIN: CPT

## 2025-03-31 PROCEDURE — G2211 COMPLEX E/M VISIT ADD ON: CPT | Mod: NC

## 2025-04-13 RX ORDER — CLOBETASOL PROPIONATE 0.5 MG/ML
0.05 SOLUTION TOPICAL
Qty: 1 | Refills: 4 | Status: ACTIVE | COMMUNITY
Start: 2025-04-13 | End: 1900-01-01

## 2025-04-21 ENCOUNTER — EMERGENCY (EMERGENCY)
Facility: HOSPITAL | Age: 35
LOS: 1 days | End: 2025-04-21
Admitting: STUDENT IN AN ORGANIZED HEALTH CARE EDUCATION/TRAINING PROGRAM
Payer: MEDICAID

## 2025-04-21 VITALS
HEART RATE: 86 BPM | HEIGHT: 64 IN | DIASTOLIC BLOOD PRESSURE: 79 MMHG | RESPIRATION RATE: 16 BRPM | SYSTOLIC BLOOD PRESSURE: 120 MMHG | WEIGHT: 190.04 LBS | TEMPERATURE: 98 F | OXYGEN SATURATION: 96 %

## 2025-04-21 VITALS
OXYGEN SATURATION: 100 % | RESPIRATION RATE: 16 BRPM | SYSTOLIC BLOOD PRESSURE: 136 MMHG | TEMPERATURE: 98 F | DIASTOLIC BLOOD PRESSURE: 84 MMHG | HEART RATE: 81 BPM

## 2025-04-21 PROCEDURE — 99284 EMERGENCY DEPT VISIT MOD MDM: CPT

## 2025-04-21 RX ORDER — METHYLPREDNISOLONE ACETATE 80 MG/ML
125 INJECTION, SUSPENSION INTRA-ARTICULAR; INTRALESIONAL; INTRAMUSCULAR; SOFT TISSUE ONCE
Refills: 0 | Status: COMPLETED | OUTPATIENT
Start: 2025-04-21 | End: 2025-04-21

## 2025-04-21 RX ORDER — DIPHENHYDRAMINE HCL 12.5MG/5ML
25 ELIXIR ORAL ONCE
Refills: 0 | Status: COMPLETED | OUTPATIENT
Start: 2025-04-21 | End: 2025-04-21

## 2025-04-21 RX ORDER — PREDNISONE 20 MG/1
2 TABLET ORAL
Qty: 8 | Refills: 0
Start: 2025-04-21 | End: 2025-04-24

## 2025-04-21 RX ADMIN — Medication 20 MILLIGRAM(S): at 10:48

## 2025-04-21 RX ADMIN — Medication 25 MILLIGRAM(S): at 10:47

## 2025-04-21 RX ADMIN — METHYLPREDNISOLONE ACETATE 125 MILLIGRAM(S): 80 INJECTION, SUSPENSION INTRA-ARTICULAR; INTRALESIONAL; INTRAMUSCULAR; SOFT TISSUE at 10:48

## 2025-04-21 NOTE — ED PROVIDER NOTE - CLINICAL SUMMARY MEDICAL DECISION MAKING FREE TEXT BOX
35 y/o female with no pmhx presents to ED c/o body rash x 3 days. Pt states she saw a dermatologist who prescribed her triamcinolone cream which hasn't helped. Was told she may have eczema. Sister has a history og eczema. Did an allergy test and allergic to hair dye. Pt denies using hair dye, new skin products, foods. Vaccines up to date as a child. No fevers, chills, cough, congestion. pt with raised maculopapular rash. likely allergic vs eczema. plan to provide prednisone, pepcid, benadryl, outpt derm follow up. pt aware not to drive.

## 2025-04-21 NOTE — ED ADULT TRIAGE NOTE - CHIEF COMPLAINT QUOTE
Pt c/o rash "all over body" x few days worse today. endorsing itchiness. noted to have red rash to the bilateral upper extremities and upper chest. denies environmental changes. denies sob. RR equal and unlabored, no acute respiratory distress at this time. denies pertinent medical history.

## 2025-04-21 NOTE — ED PROVIDER NOTE - OBJECTIVE STATEMENT
33 y/o female with no pmhx presents to ED c/o body rash x 3 days. Pt states she saw a dermatologist who prescribed her triamcinolone cream which hasn't helped. Was told she may have eczema. Sister has a history og eczema. Did an allergy test and allergic to hair dye. Pt denies using hair dye, new skin products, foods. Vaccines up to date as a child. No fevers, chills, cough, congestion, abd pain, n/v/d, SOB, swelling.

## 2025-04-21 NOTE — ED PROVIDER NOTE - PATIENT PORTAL LINK FT
You can access the FollowMyHealth Patient Portal offered by [facility] by registering at the following website: [email or web address]. By joining [employer]’s FollowMyHealth portal, you will also be able to view your health information using other applications (apps) compatible with our system.

## 2025-05-05 ENCOUNTER — APPOINTMENT (OUTPATIENT)
Dept: ENDOCRINOLOGY | Facility: CLINIC | Age: 35
End: 2025-05-05
Payer: MEDICAID

## 2025-05-05 VITALS
DIASTOLIC BLOOD PRESSURE: 77 MMHG | WEIGHT: 193 LBS | BODY MASS INDEX: 34.2 KG/M2 | HEART RATE: 85 BPM | HEIGHT: 63 IN | SYSTOLIC BLOOD PRESSURE: 114 MMHG | OXYGEN SATURATION: 100 %

## 2025-05-05 DIAGNOSIS — E04.2 NONTOXIC MULTINODULAR GOITER: ICD-10-CM

## 2025-05-05 DIAGNOSIS — N92.6 IRREGULAR MENSTRUATION, UNSPECIFIED: ICD-10-CM

## 2025-05-05 DIAGNOSIS — E66.9 OBESITY, UNSPECIFIED: ICD-10-CM

## 2025-05-05 DIAGNOSIS — E04.9 NONTOXIC GOITER, UNSPECIFIED: ICD-10-CM

## 2025-05-05 DIAGNOSIS — L68.0 HIRSUTISM: ICD-10-CM

## 2025-05-05 DIAGNOSIS — E28.2 POLYCYSTIC OVARIAN SYNDROME: ICD-10-CM

## 2025-05-05 PROCEDURE — 99214 OFFICE O/P EST MOD 30 MIN: CPT

## 2025-05-05 RX ORDER — SEMAGLUTIDE 1 MG/.5ML
1 INJECTION, SOLUTION SUBCUTANEOUS WEEKLY
Qty: 1 | Refills: 6 | Status: ACTIVE | COMMUNITY
Start: 2025-05-05 | End: 1900-01-01

## 2025-05-05 RX ORDER — METFORMIN ER 750 MG 750 MG/1
750 TABLET ORAL
Qty: 180 | Refills: 0 | Status: ACTIVE | COMMUNITY
Start: 2025-05-05 | End: 1900-01-01

## 2025-05-07 ENCOUNTER — APPOINTMENT (OUTPATIENT)
Dept: ENDOCRINOLOGY | Facility: CLINIC | Age: 35
End: 2025-05-07

## 2025-05-12 ENCOUNTER — APPOINTMENT (OUTPATIENT)
Dept: DERMATOLOGY | Facility: CLINIC | Age: 35
End: 2025-05-12

## 2025-06-16 NOTE — ED ADULT TRIAGE NOTE - WEIGHT IN KG
Spoke to patient. Relayed results of pap and pos BV. Discussed causes of BV and treatment. Precautions of PO abx given. She acknowledges understanding, will refrain from ETOH use during treatment. Pharmacy confirmed, rx sent. No questions.    83.9

## 2025-09-01 ENCOUNTER — EMERGENCY (EMERGENCY)
Facility: HOSPITAL | Age: 35
LOS: 1 days | End: 2025-09-01
Attending: STUDENT IN AN ORGANIZED HEALTH CARE EDUCATION/TRAINING PROGRAM | Admitting: STUDENT IN AN ORGANIZED HEALTH CARE EDUCATION/TRAINING PROGRAM
Payer: MEDICAID

## 2025-09-01 VITALS
HEART RATE: 95 BPM | TEMPERATURE: 98 F | OXYGEN SATURATION: 100 % | SYSTOLIC BLOOD PRESSURE: 118 MMHG | RESPIRATION RATE: 18 BRPM | DIASTOLIC BLOOD PRESSURE: 86 MMHG

## 2025-09-01 VITALS
HEIGHT: 64 IN | HEART RATE: 102 BPM | WEIGHT: 190.04 LBS | RESPIRATION RATE: 20 BRPM | DIASTOLIC BLOOD PRESSURE: 86 MMHG | TEMPERATURE: 98 F | SYSTOLIC BLOOD PRESSURE: 124 MMHG | OXYGEN SATURATION: 99 %

## 2025-09-01 PROCEDURE — 71045 X-RAY EXAM CHEST 1 VIEW: CPT | Mod: 26

## 2025-09-01 PROCEDURE — 99284 EMERGENCY DEPT VISIT MOD MDM: CPT

## 2025-09-01 RX ORDER — LIDOCAINE HCL/PF 10 MG/ML
4 VIAL (ML) INJECTION ONCE
Refills: 0 | Status: COMPLETED | OUTPATIENT
Start: 2025-09-01 | End: 2025-09-01

## 2025-09-01 RX ADMIN — Medication 4 MILLILITER(S): at 06:33
